# Patient Record
Sex: FEMALE | Race: BLACK OR AFRICAN AMERICAN | Employment: FULL TIME | URBAN - METROPOLITAN AREA
[De-identification: names, ages, dates, MRNs, and addresses within clinical notes are randomized per-mention and may not be internally consistent; named-entity substitution may affect disease eponyms.]

---

## 2018-08-17 ENCOUNTER — HOSPITAL ENCOUNTER (OUTPATIENT)
Dept: WOUND CARE | Age: 58
Discharge: HOME | End: 2018-08-17
Payer: COMMERCIAL

## 2018-08-17 VITALS
SYSTOLIC BLOOD PRESSURE: 141 MMHG | BODY MASS INDEX: 30.49 KG/M2 | RESPIRATION RATE: 18 BRPM | WEIGHT: 183 LBS | DIASTOLIC BLOOD PRESSURE: 101 MMHG | HEART RATE: 94 BPM | HEIGHT: 65 IN | TEMPERATURE: 97.9 F

## 2018-08-17 DIAGNOSIS — R60.0 LEG EDEMA, LEFT: ICD-10-CM

## 2018-08-17 DIAGNOSIS — L97.922 NON-PRESSURE CHRONIC ULCER OF LOWER LEG, LEFT, WITH FAT LAYER EXPOSED (HCC): ICD-10-CM

## 2018-08-17 DIAGNOSIS — L88 PYODERMIC GANGRENOSUM: ICD-10-CM

## 2018-08-17 LAB
ABSOLUTE EOS #: 0.1 K/UL (ref 0–0.4)
ABSOLUTE IMMATURE GRANULOCYTE: ABNORMAL K/UL (ref 0–0.3)
ABSOLUTE LYMPH #: 1.9 K/UL (ref 1–4.8)
ABSOLUTE MONO #: 0.4 K/UL (ref 0.1–1.3)
ALBUMIN SERPL-MCNC: 4.5 G/DL (ref 3.5–5.2)
ALBUMIN/GLOBULIN RATIO: ABNORMAL (ref 1–2.5)
ALP BLD-CCNC: 117 U/L (ref 35–104)
ALT SERPL-CCNC: 9 U/L (ref 5–33)
ANION GAP SERPL CALCULATED.3IONS-SCNC: 11 MMOL/L (ref 9–17)
AST SERPL-CCNC: 15 U/L
BASOPHILS # BLD: 1 % (ref 0–2)
BASOPHILS ABSOLUTE: 0.1 K/UL (ref 0–0.2)
BILIRUB SERPL-MCNC: 0.32 MG/DL (ref 0.3–1.2)
BUN BLDV-MCNC: 10 MG/DL (ref 6–20)
BUN/CREAT BLD: ABNORMAL (ref 9–20)
C-REACTIVE PROTEIN: 18.9 MG/L (ref 0–5)
CALCIUM SERPL-MCNC: 9.3 MG/DL (ref 8.6–10.4)
CHLORIDE BLD-SCNC: 103 MMOL/L (ref 98–107)
CO2: 27 MMOL/L (ref 20–31)
CREAT SERPL-MCNC: 0.56 MG/DL (ref 0.5–0.9)
DIFFERENTIAL TYPE: ABNORMAL
EOSINOPHILS RELATIVE PERCENT: 2 % (ref 0–4)
GFR AFRICAN AMERICAN: >60 ML/MIN
GFR NON-AFRICAN AMERICAN: >60 ML/MIN
GFR SERPL CREATININE-BSD FRML MDRD: ABNORMAL ML/MIN/{1.73_M2}
GFR SERPL CREATININE-BSD FRML MDRD: ABNORMAL ML/MIN/{1.73_M2}
GLUCOSE BLD-MCNC: 127 MG/DL (ref 70–99)
HCT VFR BLD CALC: 35.3 % (ref 36–46)
HEMOGLOBIN: 11.5 G/DL (ref 12–16)
IMMATURE GRANULOCYTES: ABNORMAL %
LYMPHOCYTES # BLD: 38 % (ref 24–44)
MCH RBC QN AUTO: 28.7 PG (ref 26–34)
MCHC RBC AUTO-ENTMCNC: 32.6 G/DL (ref 31–37)
MCV RBC AUTO: 88 FL (ref 80–100)
MONOCYTES # BLD: 8 % (ref 1–7)
NRBC AUTOMATED: ABNORMAL PER 100 WBC
PDW BLD-RTO: 16 % (ref 11.5–14.9)
PLATELET # BLD: 353 K/UL (ref 150–450)
PLATELET ESTIMATE: ABNORMAL
PMV BLD AUTO: 7.8 FL (ref 6–12)
POTASSIUM SERPL-SCNC: 5.1 MMOL/L (ref 3.7–5.3)
RBC # BLD: 4.01 M/UL (ref 4–5.2)
RBC # BLD: ABNORMAL 10*6/UL
RHEUMATOID FACTOR: <10 IU/ML
SEDIMENTATION RATE, ERYTHROCYTE: 32 MM (ref 0–20)
SEG NEUTROPHILS: 51 % (ref 36–66)
SEGMENTED NEUTROPHILS ABSOLUTE COUNT: 2.4 K/UL (ref 1.3–9.1)
SODIUM BLD-SCNC: 141 MMOL/L (ref 135–144)
TOTAL PROTEIN: 7.9 G/DL (ref 6.4–8.3)
WBC # BLD: 4.8 K/UL (ref 3.5–11)
WBC # BLD: ABNORMAL 10*3/UL

## 2018-08-17 PROCEDURE — 86431 RHEUMATOID FACTOR QUANT: CPT

## 2018-08-17 PROCEDURE — 86235 NUCLEAR ANTIGEN ANTIBODY: CPT

## 2018-08-17 PROCEDURE — 86038 ANTINUCLEAR ANTIBODIES: CPT

## 2018-08-17 PROCEDURE — 86812 HLA TYPING A B OR C: CPT

## 2018-08-17 PROCEDURE — 85651 RBC SED RATE NONAUTOMATED: CPT

## 2018-08-17 PROCEDURE — 36415 COLL VENOUS BLD VENIPUNCTURE: CPT

## 2018-08-17 PROCEDURE — 99203 OFFICE O/P NEW LOW 30 MIN: CPT

## 2018-08-17 PROCEDURE — 85025 COMPLETE CBC W/AUTO DIFF WBC: CPT

## 2018-08-17 PROCEDURE — 86140 C-REACTIVE PROTEIN: CPT

## 2018-08-17 PROCEDURE — 99203 OFFICE O/P NEW LOW 30 MIN: CPT | Performed by: PODIATRIST

## 2018-08-17 PROCEDURE — 80053 COMPREHEN METABOLIC PANEL: CPT

## 2018-08-17 RX ORDER — NAPROXEN 500 MG/1
500 TABLET ORAL 2 TIMES DAILY WITH MEALS
Status: ON HOLD | COMMUNITY
End: 2018-09-09 | Stop reason: HOSPADM

## 2018-08-17 RX ORDER — HYDROCODONE BITARTRATE AND ACETAMINOPHEN 5; 325 MG/1; MG/1
1 TABLET ORAL EVERY 8 HOURS PRN
Status: ON HOLD | COMMUNITY
End: 2018-08-20 | Stop reason: HOSPADM

## 2018-08-17 RX ORDER — ZOLPIDEM TARTRATE 10 MG/1
10 TABLET ORAL NIGHTLY PRN
COMMUNITY

## 2018-08-17 ASSESSMENT — PAIN DESCRIPTION - PROGRESSION: CLINICAL_PROGRESSION: NOT CHANGED

## 2018-08-17 ASSESSMENT — PAIN DESCRIPTION - FREQUENCY: FREQUENCY: CONTINUOUS

## 2018-08-17 ASSESSMENT — PAIN DESCRIPTION - ORIENTATION: ORIENTATION: LEFT;LOWER

## 2018-08-17 ASSESSMENT — PAIN DESCRIPTION - DESCRIPTORS: DESCRIPTORS: SHARP;THROBBING

## 2018-08-17 ASSESSMENT — PAIN DESCRIPTION - ONSET: ONSET: ON-GOING

## 2018-08-17 ASSESSMENT — PAIN DESCRIPTION - LOCATION: LOCATION: LEG

## 2018-08-17 ASSESSMENT — PAIN SCALES - GENERAL: PAINLEVEL_OUTOF10: 8

## 2018-08-17 ASSESSMENT — PAIN DESCRIPTION - PAIN TYPE: TYPE: CHRONIC PAIN

## 2018-08-17 NOTE — PROGRESS NOTES
[place] Application   Below Knee    NAME:  Man Holder  YOB: 1960  MEDICAL RECORD NUMBER:  [id number]  DATE:  8/17/2018     [] Removed old Richard Lida boot if indicated and wash leg with mild soap and water.  [] Applied moisturizing agent to dry skin as needed.  [x] Appied primary and secondary dressing as ordered     [x] Applied Unna roll from toes to knee overlapping each time.  [x] Applied ace wrap or coban from toes to below the knee.  [x] Secured with tape and/or metal clips covered with tape.  [x] Instructed patient/caregiver to keep dressing dry and intact. DO NOT REMOVE DRESSING.  [x] Instructed pt/family/caregiver to report excessive draining, loose bandage, wet dressing, severe pain or tingling in toes.  [x] Applied [place] dressing below the knee to left lower leg(s)        Unna Boot(s) were applied per  Guidelines.      Electronically signed by Octaviano Toscano RN on 8/17/2018 at 11:13 AM

## 2018-08-17 NOTE — PROGRESS NOTES
[address]  New Patient History and Physical      Man Holder  AGE: 62 y.o. GENDER: female  : 1960  TODAY'S DATE:  2018    Chief Complaint:   Chief Complaint   Patient presents with    Wound Check     left medial lower leg         History of the Present Illness       Man Holder is a 62 y.o. female who presents today for evaluation and treatment for a venous and non-healing/non-surgical wound which is located on the left    History of Wound: Started over a month ago, possible spider bite. Was on antibiotics, just finished, drainage noted. Had same thing over 10 years ago  Wound Type:non-healing/non-surgical  Wound Location:left leg  Modifying factors:edema    Pain Level: 8   Pain Assessment: 0-10     Abx :Yes   Cultures :were notobtained  Pain : 10    PAST MEDICAL HISTORY    History reviewed. No pertinent past medical history. MEDICATIONS    No current outpatient prescriptions on file prior to encounter. No current facility-administered medications on file prior to encounter. ALLERGIES    Allergies   Allergen Reactions    Penicillins Hives, Itching and Swelling       PAST SURGICAL HISTORY    Past Surgical History:   Procedure Laterality Date    COLONOSCOPY      TUBAL LIGATION      WRIST SURGERY Left        FAMILY HISTORY    family history includes Kidney Disease in her father; Other in her mother. SOCIAL HISTORY    Social History   Substance Use Topics    Smoking status: Never Smoker    Smokeless tobacco: Never Used    Alcohol use No       REVIEW OF SYSTEMS    Pertinent items are noted in HPI.       Objective:      BP (!) 141/101   Pulse 94   Temp 97.9 °F (36.6 °C) (Tympanic)   Resp 18   Ht 5' 5\" (1.651 m)   Wt 183 lb (83 kg)   BMI 30.45 kg/m²   General Appearance: alert and oriented to person, place and time, well-developed and well-nourished, in no acute distress    Wound:         Wound 18 Leg Left;Medial;Lower #1 possible spider bite, 2 weeks questions/problems/concerns. Other comorbidity noted and will be managed by PCP. Procedure Note  No debridement could be performed today. Will set up for biopsy and culture left leg. Wound/Ulcer Descriptions are Pre Debridement except measurements:    Wound 08/17/18 Leg Left;Medial;Lower #1 possible spider bite, 2 weeks ago (Active)   Wound Image   8/17/2018 10:19 AM   Wound Type Wound 8/17/2018 10:19 AM   Wound Other 8/17/2018 10:19 AM   Dressing Status Old drainage 8/17/2018 10:19 AM   Dressing Changed Changed/New 8/17/2018 10:19 AM   Wound Cleansed Rinsed/Irrigated with saline 8/17/2018 10:19 AM   Wound Length (cm) 3 cm 8/17/2018 10:19 AM   Wound Width (cm) 4.4 cm 8/17/2018 10:19 AM   Wound Depth (cm)  0.2 8/17/2018 10:19 AM   Calculated Wound Size (cm^2) (l*w) 13.2 cm^2 8/17/2018 10:19 AM   Wound Assessment Drainage;Red;Yellow;Black;Slough 8/17/2018 10:19 AM   Drainage Amount Moderate 8/17/2018 10:19 AM   Drainage Description Serosanguinous 8/17/2018 10:19 AM   Odor None 8/17/2018 10:19 AM   Margins Defined edges 8/17/2018 10:19 AM   Herlinda-wound Assessment Clean;Dry; Intact 8/17/2018 10:19 AM   Red%Wound Bed 20 8/17/2018 10:19 AM   Yellow%Wound Bed 60 8/17/2018 10:19 AM   Black%Wound Bed 20 8/17/2018 10:19 AM   Number of days: 0       Plan for wound  Dress per physician order  Treatment: Unna boot, silvercel     1. Discussed appropriate home care of this wound.   2. Dressings:   Orders Placed This Encounter   Procedures    CBC Auto Differential     Standing Status:   Future     Standing Expiration Date:   8/18/2019    Rheumatoid Factor     Standing Status:   Future     Standing Expiration Date:   8/17/2019    HEMANTH Screen With Reflex     Standing Status:   Future     Standing Expiration Date:   8/17/2019    HLA-B27 Antigen     Standing Status:   Future     Standing Expiration Date:   8/17/2019    Comprehensive Metabolic Panel     Standing Status:   Future     Standing Expiration Date:   8/17/2019   Amadou Cruz Anti-Scleroderma Antibody     Standing Status:   Future     Standing Expiration Date:   8/17/2019    Sedimentation Rate     Standing Status:   Future     Standing Expiration Date:   8/17/2019    C-Reactive Protein     Standing Status:   Future     Standing Expiration Date:   8/17/2019      3. Follow up: 1 week. 4. Detailed home instructions and education material given to patient prior to discharge.      Electronically signed by Maria Isabel Villa DPM on 8/17/2018 at 11:14 AM

## 2018-08-18 LAB — HLA B27: NEGATIVE

## 2018-08-19 ENCOUNTER — ANESTHESIA EVENT (OUTPATIENT)
Dept: OPERATING ROOM | Age: 58
End: 2018-08-19
Payer: COMMERCIAL

## 2018-08-20 ENCOUNTER — ANESTHESIA (OUTPATIENT)
Dept: OPERATING ROOM | Age: 58
End: 2018-08-20
Payer: COMMERCIAL

## 2018-08-20 ENCOUNTER — HOSPITAL ENCOUNTER (OUTPATIENT)
Age: 58
Setting detail: OBSERVATION
Discharge: HOME | End: 2018-08-20
Attending: PODIATRIST | Admitting: PODIATRIST
Payer: COMMERCIAL

## 2018-08-20 VITALS — DIASTOLIC BLOOD PRESSURE: 65 MMHG | TEMPERATURE: 95.2 F | OXYGEN SATURATION: 100 % | SYSTOLIC BLOOD PRESSURE: 105 MMHG

## 2018-08-20 VITALS
WEIGHT: 183 LBS | SYSTOLIC BLOOD PRESSURE: 149 MMHG | BODY MASS INDEX: 30.49 KG/M2 | RESPIRATION RATE: 20 BRPM | HEIGHT: 65 IN | HEART RATE: 77 BPM | DIASTOLIC BLOOD PRESSURE: 77 MMHG | TEMPERATURE: 97.6 F | OXYGEN SATURATION: 100 %

## 2018-08-20 DIAGNOSIS — G89.18 POST-OP PAIN: Primary | ICD-10-CM

## 2018-08-20 LAB
CULTURE: NORMAL
DIRECT EXAM: NORMAL
Lab: NORMAL
SPECIMEN DESCRIPTION: NORMAL
STATUS: NORMAL

## 2018-08-20 PROCEDURE — 87205 SMEAR GRAM STAIN: CPT

## 2018-08-20 PROCEDURE — 6360000002 HC RX W HCPCS: Performed by: NURSE ANESTHETIST, CERTIFIED REGISTERED

## 2018-08-20 PROCEDURE — 87077 CULTURE AEROBIC IDENTIFY: CPT

## 2018-08-20 PROCEDURE — 3700000001 HC ADD 15 MINUTES (ANESTHESIA): Performed by: PODIATRIST

## 2018-08-20 PROCEDURE — 2580000003 HC RX 258: Performed by: ANESTHESIOLOGY

## 2018-08-20 PROCEDURE — 87075 CULTR BACTERIA EXCEPT BLOOD: CPT

## 2018-08-20 PROCEDURE — 87102 FUNGUS ISOLATION CULTURE: CPT

## 2018-08-20 PROCEDURE — 2709999900 HC NON-CHARGEABLE SUPPLY: Performed by: PODIATRIST

## 2018-08-20 PROCEDURE — 88305 TISSUE EXAM BY PATHOLOGIST: CPT

## 2018-08-20 PROCEDURE — 87070 CULTURE OTHR SPECIMN AEROBIC: CPT

## 2018-08-20 PROCEDURE — 11100 PR BIOPSY OF SKIN LESION: CPT | Performed by: PODIATRIST

## 2018-08-20 PROCEDURE — 7100000000 HC PACU RECOVERY - FIRST 15 MIN: Performed by: PODIATRIST

## 2018-08-20 PROCEDURE — 7100000001 HC PACU RECOVERY - ADDTL 15 MIN: Performed by: PODIATRIST

## 2018-08-20 PROCEDURE — 3600000012 HC SURGERY LEVEL 2 ADDTL 15MIN: Performed by: PODIATRIST

## 2018-08-20 PROCEDURE — 2500000003 HC RX 250 WO HCPCS: Performed by: NURSE ANESTHETIST, CERTIFIED REGISTERED

## 2018-08-20 PROCEDURE — 7100000011 HC PHASE II RECOVERY - ADDTL 15 MIN: Performed by: PODIATRIST

## 2018-08-20 PROCEDURE — G0378 HOSPITAL OBSERVATION PER HR: HCPCS

## 2018-08-20 PROCEDURE — 2500000003 HC RX 250 WO HCPCS: Performed by: PODIATRIST

## 2018-08-20 PROCEDURE — 87176 TISSUE HOMOGENIZATION CULTR: CPT

## 2018-08-20 PROCEDURE — 87186 SC STD MICRODIL/AGAR DIL: CPT

## 2018-08-20 PROCEDURE — 3700000000 HC ANESTHESIA ATTENDED CARE: Performed by: PODIATRIST

## 2018-08-20 PROCEDURE — 7100000010 HC PHASE II RECOVERY - FIRST 15 MIN: Performed by: PODIATRIST

## 2018-08-20 PROCEDURE — 3600000002 HC SURGERY LEVEL 2 BASE: Performed by: PODIATRIST

## 2018-08-20 RX ORDER — SODIUM CHLORIDE 0.9 % (FLUSH) 0.9 %
10 SYRINGE (ML) INJECTION EVERY 12 HOURS SCHEDULED
Status: DISCONTINUED | OUTPATIENT
Start: 2018-08-20 | End: 2018-08-20 | Stop reason: HOSPADM

## 2018-08-20 RX ORDER — OXYCODONE HYDROCHLORIDE AND ACETAMINOPHEN 5; 325 MG/1; MG/1
1 TABLET ORAL
Status: DISCONTINUED | OUTPATIENT
Start: 2018-08-20 | End: 2018-08-20 | Stop reason: HOSPADM

## 2018-08-20 RX ORDER — LIDOCAINE HYDROCHLORIDE 10 MG/ML
1 INJECTION, SOLUTION EPIDURAL; INFILTRATION; INTRACAUDAL; PERINEURAL
Status: DISCONTINUED | OUTPATIENT
Start: 2018-08-20 | End: 2018-08-20 | Stop reason: HOSPADM

## 2018-08-20 RX ORDER — ONDANSETRON 2 MG/ML
4 INJECTION INTRAMUSCULAR; INTRAVENOUS EVERY 6 HOURS PRN
Status: CANCELLED | OUTPATIENT
Start: 2018-08-20

## 2018-08-20 RX ORDER — PROPOFOL 10 MG/ML
INJECTION, EMULSION INTRAVENOUS PRN
Status: DISCONTINUED | OUTPATIENT
Start: 2018-08-20 | End: 2018-08-20 | Stop reason: SDUPTHER

## 2018-08-20 RX ORDER — SODIUM CHLORIDE, SODIUM LACTATE, POTASSIUM CHLORIDE, CALCIUM CHLORIDE 600; 310; 30; 20 MG/100ML; MG/100ML; MG/100ML; MG/100ML
INJECTION, SOLUTION INTRAVENOUS CONTINUOUS
Status: DISCONTINUED | OUTPATIENT
Start: 2018-08-20 | End: 2018-08-20 | Stop reason: HOSPADM

## 2018-08-20 RX ORDER — HYDROCODONE BITARTRATE AND ACETAMINOPHEN 5; 325 MG/1; MG/1
2 TABLET ORAL EVERY 4 HOURS PRN
Status: CANCELLED | OUTPATIENT
Start: 2018-08-20

## 2018-08-20 RX ORDER — HYDROCODONE BITARTRATE AND ACETAMINOPHEN 5; 325 MG/1; MG/1
1 TABLET ORAL EVERY 4 HOURS PRN
Status: CANCELLED | OUTPATIENT
Start: 2018-08-20

## 2018-08-20 RX ORDER — SODIUM CHLORIDE 0.9 % (FLUSH) 0.9 %
10 SYRINGE (ML) INJECTION PRN
Status: DISCONTINUED | OUTPATIENT
Start: 2018-08-20 | End: 2018-08-20 | Stop reason: HOSPADM

## 2018-08-20 RX ORDER — DEXAMETHASONE SODIUM PHOSPHATE 4 MG/ML
INJECTION, SOLUTION INTRA-ARTICULAR; INTRALESIONAL; INTRAMUSCULAR; INTRAVENOUS; SOFT TISSUE PRN
Status: DISCONTINUED | OUTPATIENT
Start: 2018-08-20 | End: 2018-08-20 | Stop reason: SDUPTHER

## 2018-08-20 RX ORDER — MIDAZOLAM HYDROCHLORIDE 1 MG/ML
INJECTION INTRAMUSCULAR; INTRAVENOUS PRN
Status: DISCONTINUED | OUTPATIENT
Start: 2018-08-20 | End: 2018-08-20 | Stop reason: SDUPTHER

## 2018-08-20 RX ORDER — PROMETHAZINE HYDROCHLORIDE 25 MG/ML
6.25 INJECTION, SOLUTION INTRAMUSCULAR; INTRAVENOUS
Status: DISCONTINUED | OUTPATIENT
Start: 2018-08-20 | End: 2018-08-20 | Stop reason: HOSPADM

## 2018-08-20 RX ORDER — LABETALOL HYDROCHLORIDE 5 MG/ML
5 INJECTION, SOLUTION INTRAVENOUS EVERY 10 MIN PRN
Status: DISCONTINUED | OUTPATIENT
Start: 2018-08-20 | End: 2018-08-20 | Stop reason: HOSPADM

## 2018-08-20 RX ORDER — SODIUM CHLORIDE 0.9 % (FLUSH) 0.9 %
10 SYRINGE (ML) INJECTION EVERY 12 HOURS SCHEDULED
Status: CANCELLED | OUTPATIENT
Start: 2018-08-20

## 2018-08-20 RX ORDER — DIPHENHYDRAMINE HYDROCHLORIDE 50 MG/ML
12.5 INJECTION INTRAMUSCULAR; INTRAVENOUS
Status: DISCONTINUED | OUTPATIENT
Start: 2018-08-20 | End: 2018-08-20 | Stop reason: HOSPADM

## 2018-08-20 RX ORDER — FENTANYL CITRATE 50 UG/ML
INJECTION, SOLUTION INTRAMUSCULAR; INTRAVENOUS PRN
Status: DISCONTINUED | OUTPATIENT
Start: 2018-08-20 | End: 2018-08-20 | Stop reason: SDUPTHER

## 2018-08-20 RX ORDER — SODIUM CHLORIDE 0.9 % (FLUSH) 0.9 %
10 SYRINGE (ML) INJECTION PRN
Status: CANCELLED | OUTPATIENT
Start: 2018-08-20

## 2018-08-20 RX ORDER — HYDROCODONE BITARTRATE AND ACETAMINOPHEN 5; 325 MG/1; MG/1
1 TABLET ORAL EVERY 6 HOURS PRN
Qty: 28 TABLET | Refills: 0 | Status: SHIPPED | OUTPATIENT
Start: 2018-08-20 | End: 2018-08-27

## 2018-08-20 RX ORDER — ZOLPIDEM TARTRATE 5 MG/1
5 TABLET ORAL NIGHTLY PRN
Status: CANCELLED | OUTPATIENT
Start: 2018-08-20

## 2018-08-20 RX ORDER — MORPHINE SULFATE 2 MG/ML
2 INJECTION, SOLUTION INTRAMUSCULAR; INTRAVENOUS EVERY 5 MIN PRN
Status: DISCONTINUED | OUTPATIENT
Start: 2018-08-20 | End: 2018-08-20 | Stop reason: HOSPADM

## 2018-08-20 RX ORDER — LIDOCAINE HYDROCHLORIDE 20 MG/ML
INJECTION, SOLUTION INFILTRATION; PERINEURAL PRN
Status: DISCONTINUED | OUTPATIENT
Start: 2018-08-20 | End: 2018-08-20 | Stop reason: SDUPTHER

## 2018-08-20 RX ORDER — ONDANSETRON 2 MG/ML
INJECTION INTRAMUSCULAR; INTRAVENOUS PRN
Status: DISCONTINUED | OUTPATIENT
Start: 2018-08-20 | End: 2018-08-20 | Stop reason: SDUPTHER

## 2018-08-20 RX ADMIN — SODIUM CHLORIDE, POTASSIUM CHLORIDE, SODIUM LACTATE AND CALCIUM CHLORIDE: 600; 310; 30; 20 INJECTION, SOLUTION INTRAVENOUS at 13:57

## 2018-08-20 RX ADMIN — PROPOFOL 150 MG: 10 INJECTION, EMULSION INTRAVENOUS at 13:34

## 2018-08-20 RX ADMIN — SODIUM CHLORIDE, POTASSIUM CHLORIDE, SODIUM LACTATE AND CALCIUM CHLORIDE: 600; 310; 30; 20 INJECTION, SOLUTION INTRAVENOUS at 11:24

## 2018-08-20 RX ADMIN — PROPOFOL 50 MG: 10 INJECTION, EMULSION INTRAVENOUS at 13:36

## 2018-08-20 RX ADMIN — PROPOFOL 50 MG: 10 INJECTION, EMULSION INTRAVENOUS at 13:35

## 2018-08-20 RX ADMIN — LIDOCAINE HYDROCHLORIDE 60 MG: 20 INJECTION, SOLUTION INFILTRATION; PERINEURAL at 13:34

## 2018-08-20 RX ADMIN — ONDANSETRON 4 MG: 2 INJECTION INTRAMUSCULAR; INTRAVENOUS at 13:50

## 2018-08-20 RX ADMIN — FENTANYL CITRATE 100 MCG: 50 INJECTION, SOLUTION INTRAMUSCULAR; INTRAVENOUS at 13:34

## 2018-08-20 RX ADMIN — DEXAMETHASONE SODIUM PHOSPHATE 4 MG: 4 INJECTION, SOLUTION INTRAMUSCULAR; INTRAVENOUS at 13:50

## 2018-08-20 RX ADMIN — MIDAZOLAM 2 MG: 1 INJECTION INTRAMUSCULAR; INTRAVENOUS at 13:30

## 2018-08-20 ASSESSMENT — PAIN SCALES - GENERAL
PAINLEVEL_OUTOF10: 0

## 2018-08-20 ASSESSMENT — PULMONARY FUNCTION TESTS
PIF_VALUE: 16
PIF_VALUE: 8
PIF_VALUE: 21
PIF_VALUE: 16
PIF_VALUE: 2
PIF_VALUE: 16
PIF_VALUE: 2
PIF_VALUE: 16
PIF_VALUE: 0
PIF_VALUE: 15
PIF_VALUE: 16
PIF_VALUE: 2
PIF_VALUE: 14
PIF_VALUE: 21
PIF_VALUE: 16
PIF_VALUE: 16
PIF_VALUE: 20
PIF_VALUE: 33
PIF_VALUE: 16
PIF_VALUE: 16
PIF_VALUE: 18
PIF_VALUE: 16
PIF_VALUE: 16
PIF_VALUE: 2
PIF_VALUE: 16
PIF_VALUE: 1

## 2018-08-20 ASSESSMENT — ENCOUNTER SYMPTOMS
SHORTNESS OF BREATH: 0
STRIDOR: 0

## 2018-08-20 ASSESSMENT — PAIN - FUNCTIONAL ASSESSMENT: PAIN_FUNCTIONAL_ASSESSMENT: 0-10

## 2018-08-20 NOTE — H&P
HISTORY and [facility] [address]       NAME:  Sury Glaser  MRN: [id number]   YOB: 1960   Date: 8/20/2018   Age: 62 y.o. Gender: female       COMPLAINT AND PRESENT HISTORY:     Sury Glaser is 62 y.o.,  female, here for Lt Leg ulcer debridement. The symptoms started 3 weeks   Pt hit her leg 3 weeks ago, developed a blister on the medial aspect of the Lt leg. The blister broke, developed a chronic ulcer that has not responded to ABXs and dressing. Matt Inch Recently the Pt states that edges started getting dark. Pt currently has pressure dressing. Pt had a similar ulcer in the same leg about 10 years ago. Pt denies any other symptoms. PAST MEDICAL HISTORY     Past Medical History:   Diagnosis Date    Arthritis     right knee         SURGICAL HISTORY       Past Surgical History:   Procedure Laterality Date    COLONOSCOPY      TUBAL LIGATION      WRIST SURGERY Left          SOCIAL HISTORY       Social History     Social History    Marital status: Single     Spouse name: N/A    Number of children: N/A    Years of education: N/A     Social History Main Topics    Smoking status: Never Smoker    Smokeless tobacco: Never Used    Alcohol use No    Drug use: No    Sexual activity: Not Asked     Other Topics Concern    None     Social History Narrative    None           REVIEW OF SYSTEMS      Allergies   Allergen Reactions    Penicillins Hives, Itching and Swelling       No current facility-administered medications on file prior to encounter. Current Outpatient Prescriptions on File Prior to Encounter   Medication Sig Dispense Refill    zolpidem (AMBIEN) 5 MG tablet Take 5 mg by mouth nightly as needed for Sleep. Emily Fontenot HYDROcodone-acetaminophen (NORCO) 5-325 MG per tablet Take 1 tablet by mouth every 8 hours as needed for Pain. Emily Fontenot aspirin 81 MG tablet Take 81 mg by mouth daily      naproxen (NAPROSYN) 500 MG tablet Take 500 mg by mouth 2 times daily (with meals)         Negative except for what is mentioned in the HPI. GENERAL PHYSICAL EXAM     Vitals: /88   Pulse 77   Temp 97.5 °F (36.4 °C) (Oral)   Resp 14   Ht 5' 5\" (1.651 m)   Wt 183 lb (83 kg)   LMP  (LMP Unknown)   SpO2 100%   BMI 30.45 kg/m²  Body mass index is 30.45 kg/m². GENERAL APPEARANCE:   Delvis Jackson is 62 y.o.,  female, mildly obese, nourished, conscious, alert. Does not appear to be distress or pain at this time. SKIN:  Warm, dry, no cyanosis or jaundice. HEAD:  Normocephalic, atraumatic, no swelling or tenderness. EYES:  Pupils equal, reactive to light. EARS:  No discharge, no marked hearing loss. NOSE:  No rhinorrhea, epistaxis or septal deformity. THROAT:  Not congested. No ulceration bleeding or discharge. NECK:  No stiffness, trachea central.  No palpable masses or L.N.                 CHEST:  Symmetrical and equal on expansion. HEART:  RRR S1 > S2. No audible murmurs or gallops. LUNGS:  Equal on expansion, normal breath sounds. No adventitious sounds. ABDOMEN:  Obese. Soft on palpation. No localized tenderness. No guarding or rigidity. No palpable hepatosplenomegaly. LYMPHATICS:  No palpable cervical lymphadenopathy. LOCOMOTOR, BACK AND SPINE:  No tenderness or deformities. EXTREMITIES:  Symmetrical, no pretibial edema. Lt Leg ulcer currently covered. Ariess sign negative. NEUROLOGIC:  The patient is conscious, alert, oriented, No apparent focal sensory or motor deficits. PROVISIONAL DIAGNOSES / SURGERY:      Lt Leg ulcer debridement.     Patient Active Problem List    Diagnosis Date Noted    Non-pressure chronic ulcer of lower leg, left, with fat layer exposed ([address] 08/17/2018    Pyodermic gangrenosum 08/17/2018    Leg edema, left 08/17/2018           Wilda Thakur PA-C on 8/20/2018 at 11:47 AM      NAME:  Tabatha Vazquez  MRN: [id number]   YOB: 1960   Date: 8/20/2018   Age: 62 y.o. Gender: female     H&P Update Note    H&P from 8/20/2018  reviewed and updated, Patient examined. Vitals: /88   Pulse 77   Temp 97.5 °F (36.4 °C) (Oral)   Resp 14   Ht 5' 5\" (1.651 m)   Wt 183 lb (83 kg)   LMP  (LMP Unknown)   SpO2 100%   BMI 30.45 kg/m²  Body mass index is 30.45 kg/m². No interval changes except for none. I concur with the findings.        Johnathan Valles DPM on 8/20/2018 at 1:03 PM

## 2018-08-20 NOTE — OP NOTE
Case ID: [id number] Case Time: 8/20/2018  1:31 PM Surgeon: Ambrocio Levy DPM   Procedure: LEG ULCER TISSUE BIOPSY W/CULTURE Location: [address] [place] [address] OP NOTE     PATIENT NAME: Terence Goddard  YOB: 1960  -  62 y.o. female  MRN: [id number]  DATE: 8/20/2018  BILLING #: [id number]     Surgeon(s):  Ambrocio Levy DPM      ASSISTANTS: Toy Dennis DPM      PRE-OP DIAGNOSIS:   1. Chronic nonhealing wound left leg         POST-OP DIAGNOSIS: Same as above.     PROCEDURE:   1. Irrigation and debridement of left leg ulcer with culture  2. Biopsy left leg ulcer     ANESTHESIA: MAC with 20 cc 1:1 mixture of 1% lidocaine plain and 0.5% bupivicaine plain     HEMOSTASIS: No tourniquet     ESTIMATED BLOOD LOSS: Less than 10cc.     MATERIALS: None  * No implants in log *     INJECTABLES: None     SPECIMEN:   1. Left leg ulcer punch biopsy sent to pathology  2. Left leg ulcer margin punch biopsy sent to pathology  3. Left leg ulcer tissue sent to pathology   * No specimens in log *     COMPLICATIONS: None     FINDINGS: Boggy proximal wound margin tissue noted left leg ulcer        Indications:  Physical exam and patient history demonstrates chronic nonhealing wound of the left leg due to possible spider bite. Surgery is indicated at this time to remove necrotic nonviable tissue and establish healthy viable tissue for optimal healing and obtain cultures to determine cause of wound. All risks, benefits, and alternatives to the procedure were explained to the patient who voiced understanding. A consent was signed and placed in the chart. Procedure:  Patient was transferred to the operating room and placed on the table in the supine position. Following induction of general anesthesia, the left leg was scrubbed, prepped, and draped in the usual aseptic manner. Local anesthesia was obtained in the left leg using a total of 20 cc of 1:1 mixture of 1% lidocaine plain and 0.5% bupivicaine plain.  A pneumatic

## 2018-08-20 NOTE — PROGRESS NOTES
Made several attempts to contact the patients ride home. No answer or no return call. Patient wishes to leave, instructed patient this would be against medical advice. Offered to call the  For admission, offered taxi, offered to contact someone else for ride home. Patient refuses all these options. Patient is alert, oriented, has been awake the entire stay here in short stay the last 3.5 hours reading a book and signed AMA papers. Notified the resident Dr. Kusum Dickens per text in perfect serve.

## 2018-08-22 LAB
ANTI-NUCLEAR ANTIBODY (ANA): ABNORMAL
ANTI-SCLERODERMA: 10 U/ML

## 2018-08-24 ENCOUNTER — HOSPITAL ENCOUNTER (OUTPATIENT)
Dept: WOUND CARE | Age: 58
Discharge: HOME | End: 2018-08-24
Payer: COMMERCIAL

## 2018-08-24 VITALS
HEIGHT: 65 IN | RESPIRATION RATE: 20 BRPM | BODY MASS INDEX: 30.49 KG/M2 | TEMPERATURE: 98.3 F | SYSTOLIC BLOOD PRESSURE: 145 MMHG | HEART RATE: 96 BPM | DIASTOLIC BLOOD PRESSURE: 89 MMHG | WEIGHT: 183 LBS

## 2018-08-24 DIAGNOSIS — L97.922 NON-PRESSURE CHRONIC ULCER OF LOWER LEG, LEFT, WITH FAT LAYER EXPOSED (HCC): Primary | ICD-10-CM

## 2018-08-24 DIAGNOSIS — L88 PYODERMA GANGRENOSUM: ICD-10-CM

## 2018-08-24 DIAGNOSIS — G89.18 ACUTE POST-OPERATIVE PAIN: ICD-10-CM

## 2018-08-24 DIAGNOSIS — R60.0 LEG EDEMA, LEFT: ICD-10-CM

## 2018-08-24 LAB
ESTIMATED AVERAGE GLUCOSE: 120 MG/DL
HBA1C MFR BLD: 5.8 % (ref 4–6)

## 2018-08-24 PROCEDURE — 99213 OFFICE O/P EST LOW 20 MIN: CPT | Performed by: PODIATRIST

## 2018-08-24 PROCEDURE — 29580 STRAPPING UNNA BOOT: CPT

## 2018-08-24 PROCEDURE — 83036 HEMOGLOBIN GLYCOSYLATED A1C: CPT

## 2018-08-24 PROCEDURE — 36415 COLL VENOUS BLD VENIPUNCTURE: CPT

## 2018-08-24 RX ORDER — LIDOCAINE HYDROCHLORIDE 40 MG/ML
SOLUTION TOPICAL ONCE
Status: DISCONTINUED | OUTPATIENT
Start: 2018-08-24 | End: 2018-08-25 | Stop reason: HOSPADM

## 2018-08-24 RX ORDER — CIPROFLOXACIN 500 MG/1
500 TABLET, FILM COATED ORAL 2 TIMES DAILY
Qty: 28 TABLET | Refills: 0 | Status: SHIPPED | OUTPATIENT
Start: 2018-08-24 | End: 2018-08-31 | Stop reason: ALTCHOICE

## 2018-08-24 ASSESSMENT — PAIN DESCRIPTION - LOCATION: LOCATION: LEG

## 2018-08-24 ASSESSMENT — PAIN DESCRIPTION - PAIN TYPE: TYPE: CHRONIC PAIN

## 2018-08-24 ASSESSMENT — PAIN SCALES - GENERAL: PAINLEVEL_OUTOF10: 4

## 2018-08-24 ASSESSMENT — PAIN DESCRIPTION - FREQUENCY: FREQUENCY: INTERMITTENT

## 2018-08-24 ASSESSMENT — PAIN DESCRIPTION - DESCRIPTORS: DESCRIPTORS: ACHING

## 2018-08-24 ASSESSMENT — PAIN DESCRIPTION - ORIENTATION: ORIENTATION: LEFT;LOWER

## 2018-08-24 NOTE — PROGRESS NOTES
[facility]  Return Patient Progress Note      Jesus Grubbs  AGE: 62 y.o. GENDER: female  : 1960  TODAY'S DATE:  2018    Chief Complaint:   Chief Complaint   Patient presents with    Wound Check      left lower leg         History of the Present Illness       Jesus Grubbs is a 62 y.o. female who presents today for evaluation and treatment for a venous and non-healing/non-surgical wound which is located on the left leg. Patient underwent a biopsy and debridement of ulcer on (18). Patient states that she is still having pain in her leg. Patient states that she is unsure of how the wound started. Patient denies any n/v/f/c. History of Wound: Started over a month ago, possible spider bite vs trauma to leg. Was on antibiotics, just finished, drainage noted. Had same thing over 10 years ago  Wound Type:non-healing/non-surgical  Wound Location:left leg  Modifying factors:edema    Pain Level: 4   Pain Assessment: 0-10     Abx :Yes   Cultures :were notobtained  Pain : 7/10    PAST MEDICAL HISTORY        Diagnosis Date    Arthritis     right knee       MEDICATIONS    Current Outpatient Prescriptions on File Prior to Encounter   Medication Sig Dispense Refill    HYDROcodone-acetaminophen (NORCO) 5-325 MG per tablet Take 1 tablet by mouth every 6 hours as needed for Pain for up to 7 days. Intended supply: 3 days. Take lowest dose possible to manage pain. 28 tablet 0    zolpidem (AMBIEN) 5 MG tablet Take 5 mg by mouth nightly as needed for Sleep. Lizzy Velázquez naproxen (NAPROSYN) 500 MG tablet Take 500 mg by mouth 2 times daily (with meals)      aspirin 81 MG tablet Take 81 mg by mouth daily       No current facility-administered medications on file prior to encounter.         ALLERGIES    Allergies   Allergen Reactions    Penicillins Hives, Itching and Swelling       PAST SURGICAL HISTORY    Past Surgical History:   Procedure Laterality Date    COLONOSCOPY      ME INCIS/DRAIN THIGH/KNEE ABSCESS,DEEP Left 8/20/2018    LEG ULCER TISSUE BIOPSY W/CULTURE performed by Josefa Candelaria DPM at [facility] Left        FAMILY HISTORY    family history includes Kidney Disease in her father; Other in her mother. SOCIAL HISTORY    Social History   Substance Use Topics    Smoking status: Never Smoker    Smokeless tobacco: Never Used    Alcohol use No       REVIEW OF SYSTEMS    Pertinent items are noted in HPI. Objective:      BP (!) 145/89   Pulse 96   Temp 98.3 °F (36.8 °C) (Tympanic)   Resp 20   Ht 5' 5\" (1.651 m)   Wt 183 lb (83 kg)   LMP  (LMP Unknown)   BMI 30.45 kg/m²   General Appearance: alert and oriented to person, place and time, well-developed and well-nourished, in no acute distress    Wound 08/17/18 Leg Left;Medial;Lower #1 possible spider bite, 2 weeks ago (Active)   Wound Image   8/17/2018 10:19 AM   Wound Type Wound 8/24/2018  9:52 AM   Wound Other 8/24/2018  9:52 AM   Dressing Status Old drainage 8/24/2018  9:52 AM   Dressing Changed Changed/New 8/24/2018  9:52 AM   Wound Cleansed Rinsed/Irrigated with saline 8/24/2018  9:52 AM   Wound Length (cm) 3.2 cm 8/24/2018  9:52 AM   Wound Width (cm) 4.5 cm 8/24/2018  9:52 AM   Wound Depth (cm)  0.1 8/24/2018  9:52 AM   Calculated Wound Size (cm^2) (l*w) 14.4 cm^2 8/24/2018  9:52 AM   Change in Wound Size % (l*w) -9.09 8/24/2018  9:52 AM   Wound Assessment Drainage;Edema;Red; White 8/24/2018  9:52 AM   Drainage Amount Moderate 8/24/2018  9:52 AM   Drainage Description Serosanguinous 8/24/2018  9:52 AM   Odor None 8/24/2018  9:52 AM   Margins Defined edges 8/24/2018  9:52 AM   Herlinda-wound Assessment Clean;Dry; Intact 8/24/2018  9:52 AM   Red%Wound Bed 90 8/24/2018  9:52 AM   Yellow%Wound Bed 0 8/24/2018  9:52 AM   Black%Wound Bed 0 8/24/2018  9:52 AM   Other%Wound Bed 10%white 8/24/2018  9:52 AM   Number of days: 7     Integument: wound documentation see above.      Vascular:  DP/PT pulses palpable 2/4, medical students or scribes is based on my personal performance of the HPI, PE and MDM. I have personally evaluated this patient and have completed at least one if not all key elements of the E/M (history, physical exam, and MDM). Additional findings are as noted.      Ambrocio Levy DPM on 8/27/2018 at 1:06 AM  Board Certified, American Board of Podiatric Surgery  Fellow, [employer] [facility] and [facility]

## 2018-08-25 LAB
CULTURE: ABNORMAL
CULTURE: ABNORMAL
DIRECT EXAM: ABNORMAL
DIRECT EXAM: ABNORMAL
Lab: ABNORMAL
ORGANISM: ABNORMAL
SPECIMEN DESCRIPTION: ABNORMAL
STATUS: ABNORMAL

## 2018-08-27 LAB
SURGICAL PATHOLOGY REPORT: NORMAL
SURGICAL PATHOLOGY REPORT: NORMAL

## 2018-08-31 ENCOUNTER — HOSPITAL ENCOUNTER (OUTPATIENT)
Dept: WOUND CARE | Age: 58
Discharge: HOME | End: 2018-08-31
Payer: COMMERCIAL

## 2018-08-31 VITALS
BODY MASS INDEX: 30.49 KG/M2 | SYSTOLIC BLOOD PRESSURE: 139 MMHG | HEART RATE: 101 BPM | HEIGHT: 65 IN | TEMPERATURE: 97.8 F | WEIGHT: 183 LBS | DIASTOLIC BLOOD PRESSURE: 90 MMHG | RESPIRATION RATE: 20 BRPM

## 2018-08-31 DIAGNOSIS — L97.922 NON-PRESSURE CHRONIC ULCER OF LOWER LEG, LEFT, WITH FAT LAYER EXPOSED (HCC): Primary | ICD-10-CM

## 2018-08-31 DIAGNOSIS — I87.2 VENOUS INSUFFICIENCY OF LEFT LOWER EXTREMITY: ICD-10-CM

## 2018-08-31 DIAGNOSIS — R60.0 LEG EDEMA, LEFT: ICD-10-CM

## 2018-08-31 PROCEDURE — 29580 STRAPPING UNNA BOOT: CPT

## 2018-08-31 PROCEDURE — 6370000000 HC RX 637 (ALT 250 FOR IP): Performed by: PODIATRIST

## 2018-08-31 PROCEDURE — 99213 OFFICE O/P EST LOW 20 MIN: CPT | Performed by: PODIATRIST

## 2018-08-31 PROCEDURE — 99213 OFFICE O/P EST LOW 20 MIN: CPT

## 2018-08-31 RX ORDER — LIDOCAINE HYDROCHLORIDE 40 MG/ML
SOLUTION TOPICAL ONCE
Status: COMPLETED | OUTPATIENT
Start: 2018-08-31 | End: 2018-08-31

## 2018-08-31 RX ORDER — DOXYCYCLINE HYCLATE 100 MG/1
100 CAPSULE ORAL 2 TIMES DAILY
COMMUNITY
Start: 2018-08-18 | End: 2018-09-02

## 2018-08-31 RX ADMIN — LIDOCAINE HYDROCHLORIDE: 40 SOLUTION TOPICAL at 09:10

## 2018-08-31 ASSESSMENT — PAIN DESCRIPTION - DESCRIPTORS: DESCRIPTORS: ACHING

## 2018-08-31 ASSESSMENT — PAIN DESCRIPTION - PROGRESSION: CLINICAL_PROGRESSION: NOT CHANGED

## 2018-08-31 ASSESSMENT — PAIN DESCRIPTION - PAIN TYPE: TYPE: CHRONIC PAIN

## 2018-08-31 ASSESSMENT — PAIN SCALES - GENERAL: PAINLEVEL_OUTOF10: 5

## 2018-08-31 ASSESSMENT — PAIN DESCRIPTION - ORIENTATION: ORIENTATION: LEFT;LOWER

## 2018-08-31 ASSESSMENT — PAIN DESCRIPTION - ONSET: ONSET: ON-GOING

## 2018-08-31 ASSESSMENT — PAIN DESCRIPTION - LOCATION: LOCATION: LEG

## 2018-08-31 ASSESSMENT — PAIN DESCRIPTION - FREQUENCY: FREQUENCY: INTERMITTENT

## 2018-08-31 NOTE — PROGRESS NOTES
[place] Application   Below Knee    NAME:  Hayley Grant  YOB: 1960  MEDICAL RECORD NUMBER:  [id number]  DATE:  8/31/2018     [] Applied moisturizing agent to dry skin as needed.  [x] Appied primary and secondary dressing as ordered     [x] Applied Unna roll from toes to knee overlapping each time.  [x] Applied ace wrap or coban from toes to below the knee.  [] Secured with tape and/or metal clips covered with tape.  [x] Instructed patient/caregiver to keep dressing dry and intact. DO NOT REMOVE DRESSING.  [x] Instructed pt/family/caregiver to report excessive draining, loose bandage, wet dressing, severe pain or tingling in toes.  [x] Applied [place] dressing below the knee to Left lower leg(s)        Unna Boot(s) were applied per  Guidelines.      Electronically signed by Aries Thornton RN on 8/31/2018 at 9:47 AM
[facility]    TUBAL LIGATION      WRIST SURGERY Left        FAMILY HISTORY    family history includes Kidney Disease in her father; Other in her mother. SOCIAL HISTORY    Social History   Substance Use Topics    Smoking status: Never Smoker    Smokeless tobacco: Never Used    Alcohol use No       REVIEW OF SYSTEMS    Pertinent items are noted in HPI. Objective:      BP (!) 139/90   Pulse 101   Temp 97.8 °F (36.6 °C) (Tympanic)   Resp 20   Ht 5' 5\" (1.651 m)   Wt 183 lb (83 kg)   LMP  (LMP Unknown)   BMI 30.45 kg/m²   General Appearance: alert and oriented to person, place and time, well-developed and well-nourished, in no acute distress    Wound 08/17/18 Leg Left;Medial;Lower #1 LEFT LOWER LEG, 2 weeks ago (Active)   Wound Image   8/17/2018 10:19 AM   Wound Type Wound 8/31/2018  9:08 AM   Wound Traumatic 8/31/2018  9:08 AM   Dressing Status Old drainage 8/31/2018  9:08 AM   Dressing Changed Changed/New 8/31/2018  9:08 AM   Wound Cleansed Rinsed/Irrigated with saline 8/31/2018  9:08 AM   Wound Length (cm) 2.7 cm 8/31/2018  9:08 AM   Wound Width (cm) 4 cm 8/31/2018  9:08 AM   Wound Depth (cm)  .1 8/31/2018  9:08 AM   Calculated Wound Size (cm^2) (l*w) 10.8 cm^2 8/31/2018  9:08 AM   Change in Wound Size % (l*w) 18.18 8/31/2018  9:08 AM   Wound Assessment Drainage 8/31/2018  9:08 AM   Drainage Amount Moderate 8/31/2018  9:08 AM   Drainage Description Serosanguinous 8/31/2018  9:08 AM   Odor None 8/24/2018  9:52 AM   Margins Defined edges 8/24/2018  9:52 AM   Herlinda-wound Assessment Clean;Pink 8/31/2018  9:08 AM   Red%Wound Bed 80 8/31/2018  9:08 AM   Yellow%Wound Bed 10 8/31/2018  9:08 AM   Black%Wound Bed 10 8/31/2018  9:08 AM   Other%Wound Bed 10%white 8/24/2018  9:52 AM   Number of days: 13       Integument: wound documentation see above.      Vascular:  DP/PT pulses palpable 2/4, Bilateral.    CFT <3 seconds to digits 1-5, Bilateral .   Hair growth present to level of digits, Bilateral.  Edema present,

## 2018-08-31 NOTE — PLAN OF CARE
Problem: Compression therapy:  Goal: Will be free from complications associated with compression therapy  Will be free from complications associated with compression therapy   Outcome: Ongoing

## 2018-09-07 ENCOUNTER — APPOINTMENT (OUTPATIENT)
Dept: CT IMAGING | Age: 58
DRG: 689 | End: 2018-09-07
Payer: COMMERCIAL

## 2018-09-07 ENCOUNTER — HOSPITAL ENCOUNTER (OUTPATIENT)
Dept: WOUND CARE | Age: 58
Discharge: HOME | End: 2018-09-07
Payer: COMMERCIAL

## 2018-09-07 ENCOUNTER — HOSPITAL ENCOUNTER (INPATIENT)
Age: 58
LOS: 2 days | Discharge: HOME | DRG: 689 | End: 2018-09-09
Attending: EMERGENCY MEDICINE | Admitting: INTERNAL MEDICINE
Payer: COMMERCIAL

## 2018-09-07 DIAGNOSIS — L97.922 NON-PRESSURE CHRONIC ULCER OF LOWER LEG, LEFT, WITH FAT LAYER EXPOSED (HCC): Chronic | ICD-10-CM

## 2018-09-07 DIAGNOSIS — E16.2 HYPOGLYCEMIA: Primary | ICD-10-CM

## 2018-09-07 DIAGNOSIS — N17.9 AKI (ACUTE KIDNEY INJURY) (HCC): Primary | ICD-10-CM

## 2018-09-07 PROBLEM — M25.551 RIGHT HIP PAIN: Status: ACTIVE | Noted: 2018-09-07

## 2018-09-07 LAB
-: ABNORMAL
-: NORMAL
ABSOLUTE EOS #: 0 K/UL (ref 0–0.4)
ABSOLUTE IMMATURE GRANULOCYTE: ABNORMAL K/UL (ref 0–0.3)
ABSOLUTE LYMPH #: 1.5 K/UL (ref 1–4.8)
ABSOLUTE MONO #: 0.6 K/UL (ref 0.1–1.3)
AMORPHOUS: ABNORMAL
ANION GAP SERPL CALCULATED.3IONS-SCNC: 12 MMOL/L (ref 9–17)
BACTERIA: ABNORMAL
BASOPHILS # BLD: 0 % (ref 0–2)
BASOPHILS ABSOLUTE: 0 K/UL (ref 0–0.2)
BILIRUBIN URINE: NEGATIVE
BUN BLDV-MCNC: 18 MG/DL (ref 6–20)
BUN/CREAT BLD: ABNORMAL (ref 9–20)
CALCIUM SERPL-MCNC: 9.7 MG/DL (ref 8.6–10.4)
CASTS UA: ABNORMAL /LPF
CHLORIDE BLD-SCNC: 105 MMOL/L (ref 98–107)
CO2: 24 MMOL/L (ref 20–31)
COLOR: YELLOW
COMMENT UA: ABNORMAL
CREAT SERPL-MCNC: 1.88 MG/DL (ref 0.5–0.9)
CREATININE URINE: 44.5 MG/DL (ref 28–217)
CRYSTALS, UA: ABNORMAL /HPF
DIFFERENTIAL TYPE: ABNORMAL
EOSINOPHILS RELATIVE PERCENT: 0 % (ref 0–4)
EPITHELIAL CELLS UA: ABNORMAL /HPF
GFR AFRICAN AMERICAN: 33 ML/MIN
GFR NON-AFRICAN AMERICAN: 27 ML/MIN
GFR SERPL CREATININE-BSD FRML MDRD: ABNORMAL ML/MIN/{1.73_M2}
GFR SERPL CREATININE-BSD FRML MDRD: ABNORMAL ML/MIN/{1.73_M2}
GLUCOSE BLD-MCNC: 103 MG/DL (ref 70–99)
GLUCOSE URINE: NEGATIVE
HCT VFR BLD CALC: 35.5 % (ref 36–46)
HEMOGLOBIN: 11.8 G/DL (ref 12–16)
IMMATURE GRANULOCYTES: ABNORMAL %
KETONES, URINE: NEGATIVE
LEUKOCYTE ESTERASE, URINE: ABNORMAL
LYMPHOCYTES # BLD: 20 % (ref 24–44)
MCH RBC QN AUTO: 29.6 PG (ref 26–34)
MCHC RBC AUTO-ENTMCNC: 33.4 G/DL (ref 31–37)
MCV RBC AUTO: 88.8 FL (ref 80–100)
MONOCYTES # BLD: 8 % (ref 1–7)
MUCUS: ABNORMAL
NITRITE, URINE: NEGATIVE
NRBC AUTOMATED: ABNORMAL PER 100 WBC
OTHER OBSERVATIONS UA: ABNORMAL
PDW BLD-RTO: 15.9 % (ref 11.5–14.9)
PH UA: 6 (ref 5–8)
PLATELET # BLD: 349 K/UL (ref 150–450)
PLATELET ESTIMATE: ABNORMAL
PMV BLD AUTO: 8.2 FL (ref 6–12)
POTASSIUM SERPL-SCNC: 4.5 MMOL/L (ref 3.7–5.3)
PROTEIN UA: NEGATIVE
RBC # BLD: 4 M/UL (ref 4–5.2)
RBC # BLD: ABNORMAL 10*6/UL
RBC UA: ABNORMAL /HPF
REASON FOR REJECTION: NORMAL
RENAL EPITHELIAL, UA: ABNORMAL /HPF
SEG NEUTROPHILS: 72 % (ref 36–66)
SEGMENTED NEUTROPHILS ABSOLUTE COUNT: 5.4 K/UL (ref 1.3–9.1)
SODIUM BLD-SCNC: 141 MMOL/L (ref 135–144)
SODIUM,UR: 37 MMOL/L
SPECIFIC GRAVITY UA: 1.01 (ref 1–1.03)
TRICHOMONAS: ABNORMAL
TSH SERPL DL<=0.05 MIU/L-ACNC: 0.32 MIU/L (ref 0.3–5)
TURBIDITY: CLEAR
URINE HGB: NEGATIVE
UROBILINOGEN, URINE: NORMAL
WBC # BLD: 7.5 K/UL (ref 3.5–11)
WBC # BLD: ABNORMAL 10*3/UL
WBC UA: ABNORMAL /HPF
YEAST: ABNORMAL
ZZ NTE CLEAN UP: ORDERED TEST: NORMAL
ZZ NTE WITH NAME CLEAN UP: SPECIMEN SOURCE: NORMAL

## 2018-09-07 PROCEDURE — 6360000002 HC RX W HCPCS: Performed by: STUDENT IN AN ORGANIZED HEALTH CARE EDUCATION/TRAINING PROGRAM

## 2018-09-07 PROCEDURE — 81001 URINALYSIS AUTO W/SCOPE: CPT

## 2018-09-07 PROCEDURE — 82570 ASSAY OF URINE CREATININE: CPT

## 2018-09-07 PROCEDURE — 6360000002 HC RX W HCPCS: Performed by: FAMILY MEDICINE

## 2018-09-07 PROCEDURE — 99285 EMERGENCY DEPT VISIT HI MDM: CPT

## 2018-09-07 PROCEDURE — 72192 CT PELVIS W/O DYE: CPT

## 2018-09-07 PROCEDURE — 84443 ASSAY THYROID STIM HORMONE: CPT

## 2018-09-07 PROCEDURE — 70450 CT HEAD/BRAIN W/O DYE: CPT

## 2018-09-07 PROCEDURE — 99223 1ST HOSP IP/OBS HIGH 75: CPT | Performed by: INTERNAL MEDICINE

## 2018-09-07 PROCEDURE — 87086 URINE CULTURE/COLONY COUNT: CPT

## 2018-09-07 PROCEDURE — 84300 ASSAY OF URINE SODIUM: CPT

## 2018-09-07 PROCEDURE — 2580000003 HC RX 258: Performed by: STUDENT IN AN ORGANIZED HEALTH CARE EDUCATION/TRAINING PROGRAM

## 2018-09-07 PROCEDURE — 80048 BASIC METABOLIC PNL TOTAL CA: CPT

## 2018-09-07 PROCEDURE — 1200000000 HC SEMI PRIVATE

## 2018-09-07 PROCEDURE — 36415 COLL VENOUS BLD VENIPUNCTURE: CPT

## 2018-09-07 PROCEDURE — 6370000000 HC RX 637 (ALT 250 FOR IP): Performed by: INTERNAL MEDICINE

## 2018-09-07 PROCEDURE — 85025 COMPLETE CBC W/AUTO DIFF WBC: CPT

## 2018-09-07 PROCEDURE — 2580000003 HC RX 258: Performed by: EMERGENCY MEDICINE

## 2018-09-07 PROCEDURE — 82947 ASSAY GLUCOSE BLOOD QUANT: CPT

## 2018-09-07 PROCEDURE — 6370000000 HC RX 637 (ALT 250 FOR IP): Performed by: FAMILY MEDICINE

## 2018-09-07 RX ORDER — ACETAMINOPHEN 325 MG/1
650 TABLET ORAL EVERY 4 HOURS PRN
Status: DISCONTINUED | OUTPATIENT
Start: 2018-09-07 | End: 2018-09-09 | Stop reason: HOSPADM

## 2018-09-07 RX ORDER — SODIUM CHLORIDE 9 MG/ML
INJECTION, SOLUTION INTRAVENOUS CONTINUOUS
Status: DISCONTINUED | OUTPATIENT
Start: 2018-09-07 | End: 2018-09-08

## 2018-09-07 RX ORDER — SODIUM CHLORIDE 0.9 % (FLUSH) 0.9 %
10 SYRINGE (ML) INJECTION PRN
Status: DISCONTINUED | OUTPATIENT
Start: 2018-09-07 | End: 2018-09-09 | Stop reason: HOSPADM

## 2018-09-07 RX ORDER — ACETAMINOPHEN 325 MG/1
650 TABLET ORAL EVERY 4 HOURS PRN
Status: DISCONTINUED | OUTPATIENT
Start: 2018-09-07 | End: 2018-09-07

## 2018-09-07 RX ORDER — 0.9 % SODIUM CHLORIDE 0.9 %
1000 INTRAVENOUS SOLUTION INTRAVENOUS ONCE
Status: COMPLETED | OUTPATIENT
Start: 2018-09-07 | End: 2018-09-07

## 2018-09-07 RX ORDER — HEPARIN SODIUM 5000 [USP'U]/ML
5000 INJECTION, SOLUTION INTRAVENOUS; SUBCUTANEOUS EVERY 8 HOURS SCHEDULED
Status: DISCONTINUED | OUTPATIENT
Start: 2018-09-07 | End: 2018-09-09 | Stop reason: HOSPADM

## 2018-09-07 RX ORDER — M-VIT,TX,IRON,MINS/CALC/FOLIC 27MG-0.4MG
1 TABLET ORAL DAILY
COMMUNITY

## 2018-09-07 RX ORDER — OXYCODONE HYDROCHLORIDE AND ACETAMINOPHEN 5; 325 MG/1; MG/1
1 TABLET ORAL EVERY 6 HOURS PRN
Status: DISCONTINUED | OUTPATIENT
Start: 2018-09-07 | End: 2018-09-09 | Stop reason: HOSPADM

## 2018-09-07 RX ORDER — ONDANSETRON 2 MG/ML
4 INJECTION INTRAMUSCULAR; INTRAVENOUS EVERY 6 HOURS PRN
Status: DISCONTINUED | OUTPATIENT
Start: 2018-09-07 | End: 2018-09-09 | Stop reason: HOSPADM

## 2018-09-07 RX ORDER — SODIUM CHLORIDE 0.9 % (FLUSH) 0.9 %
10 SYRINGE (ML) INJECTION EVERY 12 HOURS SCHEDULED
Status: DISCONTINUED | OUTPATIENT
Start: 2018-09-07 | End: 2018-09-09 | Stop reason: HOSPADM

## 2018-09-07 RX ORDER — ASPIRIN 81 MG/1
81 TABLET ORAL DAILY
Status: DISCONTINUED | OUTPATIENT
Start: 2018-09-07 | End: 2018-09-09 | Stop reason: HOSPADM

## 2018-09-07 RX ADMIN — ASPIRIN 81 MG: 81 TABLET, COATED ORAL at 20:06

## 2018-09-07 RX ADMIN — SODIUM CHLORIDE: 9 INJECTION, SOLUTION INTRAVENOUS at 18:17

## 2018-09-07 RX ADMIN — ACETAMINOPHEN 650 MG: 325 TABLET, FILM COATED ORAL at 13:40

## 2018-09-07 RX ADMIN — VITAMIN D, TAB 1000IU (100/BT) 1000 UNITS: 25 TAB at 20:06

## 2018-09-07 RX ADMIN — CEFTRIAXONE SODIUM 1 G: 1 INJECTION, POWDER, FOR SOLUTION INTRAMUSCULAR; INTRAVENOUS at 18:17

## 2018-09-07 RX ADMIN — HEPARIN SODIUM 5000 UNITS: 5000 INJECTION, SOLUTION INTRAVENOUS; SUBCUTANEOUS at 22:13

## 2018-09-07 RX ADMIN — OXYCODONE HYDROCHLORIDE AND ACETAMINOPHEN 1 TABLET: 5; 325 TABLET ORAL at 20:06

## 2018-09-07 RX ADMIN — SODIUM CHLORIDE 1000 ML: 9 INJECTION, SOLUTION INTRAVENOUS at 11:05

## 2018-09-07 ASSESSMENT — ENCOUNTER SYMPTOMS
NAUSEA: 0
BACK PAIN: 0
COUGH: 0
EYE PAIN: 0
ABDOMINAL PAIN: 0
RHINORRHEA: 0
SHORTNESS OF BREATH: 0
EYE REDNESS: 0
VOMITING: 0

## 2018-09-07 ASSESSMENT — PAIN DESCRIPTION - LOCATION: LOCATION: HEAD

## 2018-09-07 ASSESSMENT — PAIN SCALES - GENERAL
PAINLEVEL_OUTOF10: 1
PAINLEVEL_OUTOF10: 4
PAINLEVEL_OUTOF10: 0
PAINLEVEL_OUTOF10: 8
PAINLEVEL_OUTOF10: 7
PAINLEVEL_OUTOF10: 3

## 2018-09-07 ASSESSMENT — PAIN DESCRIPTION - PAIN TYPE: TYPE: ACUTE PAIN

## 2018-09-07 NOTE — H&P
RBC Morphology NOT REPORTED     Platelet Estimate NOT REPORTED    SPECIMEN REJECTION    Collection Time: 09/07/18  9:45 AM   Result Value Ref Range    Specimen Source . BLOOD     Ordered Test BMP, TSH     Reason for Rejection Unable to perform testing: Specimen hemolyzed.      - NOT REPORTED    Basic Metabolic Panel    Collection Time: 09/07/18 10:19 AM   Result Value Ref Range    Glucose 103 (H) 70 - 99 mg/dL    BUN 18 6 - 20 mg/dL    CREATININE 1.88 (H) 0.50 - 0.90 mg/dL    Bun/Cre Ratio NOT REPORTED 9 - 20    Calcium 9.7 8.6 - 10.4 mg/dL    Sodium 141 135 - 144 mmol/L    Potassium 4.5 3.7 - 5.3 mmol/L    Chloride 105 98 - 107 mmol/L    CO2 24 20 - 31 mmol/L    Anion Gap 12 9 - 17 mmol/L    GFR Non-African American 27 (L) >60 mL/min    GFR  33 (L) >60 mL/min    GFR Comment          GFR Staging NOT REPORTED    TSH without Reflex    Collection Time: 09/07/18 10:19 AM   Result Value Ref Range    TSH 0.32 0.30 - 5.00 mIU/L   Urinalysis    Collection Time: 09/07/18 10:50 AM   Result Value Ref Range    Color, UA YELLOW YEL    Turbidity UA CLEAR CLEAR    Glucose, Ur NEGATIVE NEG    Bilirubin Urine NEGATIVE NEG    Ketones, Urine NEGATIVE NEG    Specific Gravity, UA 1.008 1.000 - 1.030    Urine Hgb NEGATIVE NEG    pH, UA 6.0 5.0 - 8.0    Protein, UA NEGATIVE NEG    Urobilinogen, Urine Normal NORM    Nitrite, Urine NEGATIVE NEG    Leukocyte Esterase, Urine TRACE (A) NEG    Urinalysis Comments NOT REPORTED    Microscopic Urinalysis    Collection Time: 09/07/18 10:50 AM   Result Value Ref Range    -          WBC, UA 2 TO 5 /HPF    RBC, UA 0 TO 2 /HPF    Casts UA NOT REPORTED /LPF    Crystals UA NOT REPORTED NONE /HPF    Epithelial Cells UA 2 TO 5 /HPF    Renal Epithelial, Urine NOT REPORTED 0 /HPF    Bacteria, UA FEW (A) NONE    Mucus, UA NOT REPORTED NONE    Trichomonas, UA NOT REPORTED NONE    Amorphous, UA 1+ (A) NONE    Other Observations UA NOT REPORTED NREQ    Yeast, UA NOT REPORTED NONE Imaging/Diagnostics:  Ct Head Wo Contrast    Result Date: 9/7/2018  EXAMINATION: CT OF THE HEAD WITHOUT CONTRAST  9/7/2018 9:56 am TECHNIQUE: CT of the head was performed without the administration of intravenous contrast. Dose modulation, iterative reconstruction, and/or weight based adjustment of the mA/kV was utilized to reduce the radiation dose to as low as reasonably achievable. COMPARISON: 31 January 2009 HISTORY: ORDERING SYSTEM PROVIDED HISTORY: fall TECHNOLOGIST PROVIDED HISTORY: Ordering Physician Provided Reason for Exam: pt states she fell yesterday, having headaches/dizziness and confusion today Acuity: Acute Type of Exam: Initial FINDINGS: BRAIN/VENTRICLES: There is no acute intracranial hemorrhage, mass effect or midline shift. No abnormal extra-axial fluid collection. The gray-white differentiation is maintained without evidence of an acute infarct. There is no evidence of hydrocephalus. A tiny remote left basal ganglial infarct is noted. ORBITS: The visualized portion of the orbits demonstrate no acute abnormality. SINUSES: Mild mucoperiosteal thickening involves the ethmoid air cells and right sphenoid sinus. Mastoid air cells are appropriately aerated. SOFT TISSUES/SKULL:  No acute abnormality of the visualized skull or soft tissues. Estimated biologic radiation dose for this procedure:952.11 mGy/cm2. No acute intracranial abnormality. Mild sinus inflammation as above. Assessment :      Primary Problem  <principal problem not specified>    Active Hospital Problems    Diagnosis Date Noted    ANYA (acute kidney injury) ([facility] [N17.9] 09/07/2018       Plan:     Patient status Admit as inpatient in the  Progressive Unit/Step down    1. ANYA - Unclear etiology. Had poor po intake, also recently stopped ciprofloxacin 2 days ago, taking NSAIDs and oxycodone. Had a previous work up 8/16 : CRP 18.9, ESR 32, RF negative, HLA b27 negative, HEMANTH equivocal    Cr 1.88, and GFR 34.   On 8/17, GFR >60, and Cr 0.56  1. UA with leukocyte esterase, bacteria, symptomatic, culture pendng  2. Monitor I&O, UOP  3. Continue IVF    Right lower extremity chronic wound - wound care is consulted    DVT ppx - scds, heparin prophylactic    Consultations:   IP CONSULT TO INTERNAL MEDICINE  IP CONSULT TO Javi     Patient is admitted as inpatient status because of co-morbidities listed above, severity of signs and symptoms as outlined, requirement for current medical therapies and most importantly because of direct risk to patient if care not provided in a hospital setting.     Ellis Alfred DO  9/7/2018  4:21 PM    Copy sent to Dr. Samantha Jin MD

## 2018-09-07 NOTE — ED PROVIDER NOTES
REFLEX           EMERGENCY DEPARTMENT COURSE:   Vitals:    Vitals:    09/07/18 0915 09/07/18 0930 09/07/18 1052   BP: 103/81 138/87 (!) 134/92   Pulse: 78  70   Resp: 18  16   Temp: 98.6 °F (37 °C)     TempSrc: Oral     SpO2: 99%  98%   Weight: 183 lb (83 kg)     Height: 5' 4\" (1.626 m)       -------------------------  BP: (!) 134/92, Temp: 98.6 °F (37 °C), Pulse: 70, Resp: 16      FINAL IMPRESSION      1.  ANYA (acute kidney injury) [facility])          [address] Admitted 09/07/2018 12:22:27 PM      (Please note that portions of this note were completed with a voice recognition program.  Efforts were made to edit the dictations but occasionally words are mis-transcribed.)    Stefanie Marquez MD, SHANON, [address] [facility],[address]  Attending Emergency Physician                     Stefanie Marquez MD  09/07/18 0421

## 2018-09-07 NOTE — FLOWSHEET NOTE
09/07/18 1345   Encounter Summary   Services provided to: Patient   Referral/Consult From: [facility]   Support System Children   Continue Visiting (9-7-18)   Complexity of Encounter Moderate   Length of Encounter 15 minutes   Spiritual Assessment Completed Yes   Routine   Type Initial   Spiritual/Nondenominational   Type Spiritual support   Assessment Approachable   Intervention Active listening;Explored feelings, thoughts, concerns;Prayer;Sustaining presence/ Ministry of presence   Outcome Expressed gratitude;Expressed feelings/needs/concerns

## 2018-09-08 LAB
ABSOLUTE EOS #: 0.1 K/UL (ref 0–0.4)
ABSOLUTE IMMATURE GRANULOCYTE: ABNORMAL K/UL (ref 0–0.3)
ABSOLUTE LYMPH #: 1.7 K/UL (ref 1–4.8)
ABSOLUTE MONO #: 0.4 K/UL (ref 0.1–1.3)
ANION GAP SERPL CALCULATED.3IONS-SCNC: 13 MMOL/L (ref 9–17)
BASOPHILS # BLD: 1 % (ref 0–2)
BASOPHILS ABSOLUTE: 0 K/UL (ref 0–0.2)
BUN BLDV-MCNC: 19 MG/DL (ref 6–20)
BUN/CREAT BLD: ABNORMAL (ref 9–20)
CALCIUM SERPL-MCNC: 9.1 MG/DL (ref 8.6–10.4)
CHLORIDE BLD-SCNC: 107 MMOL/L (ref 98–107)
CO2: 23 MMOL/L (ref 20–31)
CREAT SERPL-MCNC: 1.9 MG/DL (ref 0.5–0.9)
CULTURE: NORMAL
DIFFERENTIAL TYPE: ABNORMAL
EOSINOPHIL,URINE: NORMAL
EOSINOPHILS RELATIVE PERCENT: 1 % (ref 0–4)
GFR AFRICAN AMERICAN: 33 ML/MIN
GFR NON-AFRICAN AMERICAN: 27 ML/MIN
GFR SERPL CREATININE-BSD FRML MDRD: ABNORMAL ML/MIN/{1.73_M2}
GFR SERPL CREATININE-BSD FRML MDRD: ABNORMAL ML/MIN/{1.73_M2}
GLUCOSE BLD-MCNC: 98 MG/DL (ref 70–99)
HCT VFR BLD CALC: 33.8 % (ref 36–46)
HEMOGLOBIN: 10.9 G/DL (ref 12–16)
IMMATURE GRANULOCYTES: ABNORMAL %
LYMPHOCYTES # BLD: 29 % (ref 24–44)
Lab: NORMAL
MCH RBC QN AUTO: 28.8 PG (ref 26–34)
MCHC RBC AUTO-ENTMCNC: 32.4 G/DL (ref 31–37)
MCV RBC AUTO: 89 FL (ref 80–100)
MONOCYTES # BLD: 8 % (ref 1–7)
NRBC AUTOMATED: ABNORMAL PER 100 WBC
PDW BLD-RTO: 15.8 % (ref 11.5–14.9)
PLATELET # BLD: 340 K/UL (ref 150–450)
PLATELET ESTIMATE: ABNORMAL
PMV BLD AUTO: 8.6 FL (ref 6–12)
POTASSIUM SERPL-SCNC: 4 MMOL/L (ref 3.7–5.3)
RBC # BLD: 3.79 M/UL (ref 4–5.2)
RBC # BLD: ABNORMAL 10*6/UL
SEG NEUTROPHILS: 61 % (ref 36–66)
SEGMENTED NEUTROPHILS ABSOLUTE COUNT: 3.7 K/UL (ref 1.3–9.1)
SODIUM BLD-SCNC: 143 MMOL/L (ref 135–144)
SODIUM,UR: 72 MMOL/L
SPECIMEN DESCRIPTION: NORMAL
STATUS: NORMAL
WBC # BLD: 5.9 K/UL (ref 3.5–11)
WBC # BLD: ABNORMAL 10*3/UL

## 2018-09-08 PROCEDURE — 6360000002 HC RX W HCPCS: Performed by: STUDENT IN AN ORGANIZED HEALTH CARE EDUCATION/TRAINING PROGRAM

## 2018-09-08 PROCEDURE — 97162 PT EVAL MOD COMPLEX 30 MIN: CPT

## 2018-09-08 PROCEDURE — 87205 SMEAR GRAM STAIN: CPT

## 2018-09-08 PROCEDURE — 6360000002 HC RX W HCPCS: Performed by: FAMILY MEDICINE

## 2018-09-08 PROCEDURE — 6370000000 HC RX 637 (ALT 250 FOR IP): Performed by: FAMILY MEDICINE

## 2018-09-08 PROCEDURE — 6370000000 HC RX 637 (ALT 250 FOR IP): Performed by: INTERNAL MEDICINE

## 2018-09-08 PROCEDURE — 36415 COLL VENOUS BLD VENIPUNCTURE: CPT

## 2018-09-08 PROCEDURE — 80048 BASIC METABOLIC PNL TOTAL CA: CPT

## 2018-09-08 PROCEDURE — 99233 SBSQ HOSP IP/OBS HIGH 50: CPT | Performed by: INTERNAL MEDICINE

## 2018-09-08 PROCEDURE — 85025 COMPLETE CBC W/AUTO DIFF WBC: CPT

## 2018-09-08 PROCEDURE — 2580000003 HC RX 258: Performed by: STUDENT IN AN ORGANIZED HEALTH CARE EDUCATION/TRAINING PROGRAM

## 2018-09-08 PROCEDURE — 84300 ASSAY OF URINE SODIUM: CPT

## 2018-09-08 PROCEDURE — 1200000000 HC SEMI PRIVATE

## 2018-09-08 PROCEDURE — 97116 GAIT TRAINING THERAPY: CPT

## 2018-09-08 PROCEDURE — 2580000003 HC RX 258: Performed by: INTERNAL MEDICINE

## 2018-09-08 RX ORDER — SODIUM CHLORIDE 450 MG/100ML
INJECTION, SOLUTION INTRAVENOUS CONTINUOUS
Status: DISCONTINUED | OUTPATIENT
Start: 2018-09-08 | End: 2018-09-09 | Stop reason: HOSPADM

## 2018-09-08 RX ORDER — ZOLPIDEM TARTRATE 10 MG/1
10 TABLET ORAL NIGHTLY PRN
Status: DISCONTINUED | OUTPATIENT
Start: 2018-09-08 | End: 2018-09-09 | Stop reason: HOSPADM

## 2018-09-08 RX ADMIN — SODIUM CHLORIDE: 9 INJECTION, SOLUTION INTRAVENOUS at 10:01

## 2018-09-08 RX ADMIN — VITAMIN D, TAB 1000IU (100/BT) 1000 UNITS: 25 TAB at 09:58

## 2018-09-08 RX ADMIN — OXYCODONE HYDROCHLORIDE AND ACETAMINOPHEN 1 TABLET: 5; 325 TABLET ORAL at 18:32

## 2018-09-08 RX ADMIN — ZOLPIDEM TARTRATE 10 MG: 10 TABLET, FILM COATED ORAL at 20:56

## 2018-09-08 RX ADMIN — SODIUM CHLORIDE: 4.5 INJECTION, SOLUTION INTRAVENOUS at 15:15

## 2018-09-08 RX ADMIN — ASPIRIN 81 MG: 81 TABLET, COATED ORAL at 09:58

## 2018-09-08 RX ADMIN — HEPARIN SODIUM 5000 UNITS: 5000 INJECTION, SOLUTION INTRAVENOUS; SUBCUTANEOUS at 05:44

## 2018-09-08 RX ADMIN — OXYCODONE HYDROCHLORIDE AND ACETAMINOPHEN 1 TABLET: 5; 325 TABLET ORAL at 09:58

## 2018-09-08 RX ADMIN — CEFTRIAXONE SODIUM 1 G: 1 INJECTION, POWDER, FOR SOLUTION INTRAMUSCULAR; INTRAVENOUS at 18:32

## 2018-09-08 RX ADMIN — OXYCODONE HYDROCHLORIDE AND ACETAMINOPHEN 1 TABLET: 5; 325 TABLET ORAL at 02:27

## 2018-09-08 RX ADMIN — SODIUM CHLORIDE: 4.5 INJECTION, SOLUTION INTRAVENOUS at 22:14

## 2018-09-08 RX ADMIN — HEPARIN SODIUM 5000 UNITS: 5000 INJECTION, SOLUTION INTRAVENOUS; SUBCUTANEOUS at 20:56

## 2018-09-08 RX ADMIN — HEPARIN SODIUM 5000 UNITS: 5000 INJECTION, SOLUTION INTRAVENOUS; SUBCUTANEOUS at 14:55

## 2018-09-08 ASSESSMENT — PAIN SCALES - GENERAL
PAINLEVEL_OUTOF10: 0
PAINLEVEL_OUTOF10: 7
PAINLEVEL_OUTOF10: 8
PAINLEVEL_OUTOF10: 8
PAINLEVEL_OUTOF10: 3
PAINLEVEL_OUTOF10: 7

## 2018-09-08 ASSESSMENT — PAIN DESCRIPTION - PAIN TYPE
TYPE: CHRONIC PAIN
TYPE: ACUTE PAIN;CHRONIC PAIN

## 2018-09-08 ASSESSMENT — PAIN DESCRIPTION - LOCATION
LOCATION: HIP;LEG
LOCATION: HIP

## 2018-09-08 ASSESSMENT — PAIN DESCRIPTION - ORIENTATION: ORIENTATION: RIGHT;LEFT

## 2018-09-08 NOTE — CARE COORDINATION
CASE MANAGEMENT NOTE:    Admission Date:  9/7/2018 Ramses Condon is a 62 y.o.  female    Admitted for : ANYA (acute kidney injury) ([facility] [address] [N17.9]  ANYA (acute kidney injury) ([facility] [address] [N17.9]  Acute kidney injury ([facility] [address] [N17.9]    Met with:  Patient    PCP:  James Reyes 75:  [employer]      Current Residence/ Living Arrangements:  independently at home , Lives alone            Current Services PTA:  Yes, Current at [address]    Is patient agreeable to VNS: No    Freedom of choice provided: Yes    List of [address] provided: No, Declines    VNS chosen:  No    DME:  straight cane, If needed    Home Oxygen: No    Nebulizer: No    CPAP/BIPAP: No    Supplier: N/A    Potential Assistance Needed: Yes, Follow for Otto Diane    SNF needed: No    Pharmacy:  [facility]       Does Patient want to use MEDS to BEDS? No    Family Members/Caregivers that pt would like involved in their care:    Yes    If yes, list name here[facility]  [facility]    Transportation Provider:  Patient                      Discharge Plan:  9/8/18 BC Pt. Lives in upper duplex, W/ Steps, Alone. DME Cane if needed. Follow for Otto Diane. Current w/ [address]. PT/OT, Cr. 1.90, Renal to see. IV Rocephin.  Denies VNS, will follow rec.//KB                 Electronically signed by: Darek Peres RN on 9/8/2018 at 2:54 PM

## 2018-09-08 NOTE — FLOWSHEET NOTE
AD document given to patient per her request; patient did not want to execute documents at this time; patient will advise SC with further needs or concerns in this regard; patient anxious as she discussed her medical circumstances and the loss of her ; patient has a strong naif in [facility] and was encouraged and comforted by prayer and Marcia corbett; listening presence and support provided;     09/08/18 1305   Encounter Summary   Services provided to: Patient   Referral/Consult From: Patient;Rounding   Continue Visiting (9/8/18)   Complexity of Encounter Moderate   Length of Encounter 30 minutes   Spiritual Assessment Completed Yes   Routine   Type Follow up   Spiritual/Denominational   Type Spiritual support   Assessment Approachable; Anxious; Helplessness; Hopeful;Coping   Intervention Active listening;Explored feelings, thoughts, concerns;Nurtured hope;Prayer;Sustaining presence/ Ministry of presence; Discussed belief system/Moravian practices/naif;Discussed illness/injury and it's impact   Outcome Comfort;Expressed gratitude;Engaged in conversation;Expressed feelings/needs/concerns;Coping; Hopeful;Receptive   Advance Directives (For Healthcare)   Healthcare Directive No, patient does not have an advance directive for healthcare treatment   Patient Requests Assistance Yes, referral made to    Advance Directives Documents given

## 2018-09-08 NOTE — PLAN OF CARE
Problem: Falls - Risk of:  Goal: Will remain free from falls  Will remain free from falls   Outcome: Ongoing  Patient has been free from falls this shift. Call light is within reach, side rails up x2, bed in lowest position. Problem: Pain:  Goal: Pain level will decrease  Pain level will decrease   Outcome: Ongoing  Pain level has decreased with prescribed pain medications.

## 2018-09-09 VITALS
RESPIRATION RATE: 18 BRPM | SYSTOLIC BLOOD PRESSURE: 125 MMHG | DIASTOLIC BLOOD PRESSURE: 89 MMHG | BODY MASS INDEX: 31.24 KG/M2 | HEIGHT: 64 IN | OXYGEN SATURATION: 100 % | TEMPERATURE: 98.4 F | HEART RATE: 76 BPM | WEIGHT: 183 LBS

## 2018-09-09 LAB
% CKMB: 1.7 % (ref 0–3)
ABSOLUTE EOS #: 0.1 K/UL (ref 0–0.4)
ABSOLUTE IMMATURE GRANULOCYTE: ABNORMAL K/UL (ref 0–0.3)
ABSOLUTE LYMPH #: 1.9 K/UL (ref 1–4.8)
ABSOLUTE MONO #: 0.4 K/UL (ref 0.1–1.3)
ANION GAP SERPL CALCULATED.3IONS-SCNC: 13 MMOL/L (ref 9–17)
BASOPHILS # BLD: 1 % (ref 0–2)
BASOPHILS ABSOLUTE: 0 K/UL (ref 0–0.2)
BUN BLDV-MCNC: 13 MG/DL (ref 6–20)
BUN/CREAT BLD: ABNORMAL (ref 9–20)
CALCIUM SERPL-MCNC: 9 MG/DL (ref 8.6–10.4)
CHLORIDE BLD-SCNC: 108 MMOL/L (ref 98–107)
CK MB: <1 NG/ML
CKMB INTERPRETATION: NORMAL
CO2: 23 MMOL/L (ref 20–31)
CREAT SERPL-MCNC: 1.4 MG/DL (ref 0.5–0.9)
DIFFERENTIAL TYPE: ABNORMAL
EOSINOPHILS RELATIVE PERCENT: 2 % (ref 0–4)
GFR AFRICAN AMERICAN: 47 ML/MIN
GFR NON-AFRICAN AMERICAN: 39 ML/MIN
GFR SERPL CREATININE-BSD FRML MDRD: ABNORMAL ML/MIN/{1.73_M2}
GFR SERPL CREATININE-BSD FRML MDRD: ABNORMAL ML/MIN/{1.73_M2}
GLUCOSE BLD-MCNC: 95 MG/DL (ref 70–99)
HCT VFR BLD CALC: 33.3 % (ref 36–46)
HEMOGLOBIN: 10.7 G/DL (ref 12–16)
IMMATURE GRANULOCYTES: ABNORMAL %
LYMPHOCYTES # BLD: 37 % (ref 24–44)
MCH RBC QN AUTO: 28.7 PG (ref 26–34)
MCHC RBC AUTO-ENTMCNC: 32.2 G/DL (ref 31–37)
MCV RBC AUTO: 89.1 FL (ref 80–100)
MONOCYTES # BLD: 9 % (ref 1–7)
NRBC AUTOMATED: ABNORMAL PER 100 WBC
PDW BLD-RTO: 15.2 % (ref 11.5–14.9)
PLATELET # BLD: 318 K/UL (ref 150–450)
PLATELET ESTIMATE: ABNORMAL
PMV BLD AUTO: 8.5 FL (ref 6–12)
POTASSIUM SERPL-SCNC: 4.1 MMOL/L (ref 3.7–5.3)
RBC # BLD: 3.74 M/UL (ref 4–5.2)
RBC # BLD: ABNORMAL 10*6/UL
SEG NEUTROPHILS: 51 % (ref 36–66)
SEGMENTED NEUTROPHILS ABSOLUTE COUNT: 2.6 K/UL (ref 1.3–9.1)
SODIUM BLD-SCNC: 144 MMOL/L (ref 135–144)
TOTAL CK: 58 U/L (ref 26–192)
WBC # BLD: 5.1 K/UL (ref 3.5–11)
WBC # BLD: ABNORMAL 10*3/UL

## 2018-09-09 PROCEDURE — 51798 US URINE CAPACITY MEASURE: CPT

## 2018-09-09 PROCEDURE — 2580000003 HC RX 258: Performed by: INTERNAL MEDICINE

## 2018-09-09 PROCEDURE — 6370000000 HC RX 637 (ALT 250 FOR IP): Performed by: FAMILY MEDICINE

## 2018-09-09 PROCEDURE — 97116 GAIT TRAINING THERAPY: CPT

## 2018-09-09 PROCEDURE — 6360000002 HC RX W HCPCS: Performed by: FAMILY MEDICINE

## 2018-09-09 PROCEDURE — 82553 CREATINE MB FRACTION: CPT

## 2018-09-09 PROCEDURE — 82550 ASSAY OF CK (CPK): CPT

## 2018-09-09 PROCEDURE — 80048 BASIC METABOLIC PNL TOTAL CA: CPT

## 2018-09-09 PROCEDURE — 6370000000 HC RX 637 (ALT 250 FOR IP): Performed by: INTERNAL MEDICINE

## 2018-09-09 PROCEDURE — 36415 COLL VENOUS BLD VENIPUNCTURE: CPT

## 2018-09-09 PROCEDURE — 99239 HOSP IP/OBS DSCHRG MGMT >30: CPT | Performed by: INTERNAL MEDICINE

## 2018-09-09 PROCEDURE — 85025 COMPLETE CBC W/AUTO DIFF WBC: CPT

## 2018-09-09 RX ORDER — ACETAMINOPHEN 325 MG/1
650 TABLET ORAL EVERY 4 HOURS PRN
Qty: 120 TABLET | Refills: 3 | Status: ON HOLD | OUTPATIENT
Start: 2018-09-09 | End: 2018-11-12 | Stop reason: HOSPADM

## 2018-09-09 RX ADMIN — HEPARIN SODIUM 5000 UNITS: 5000 INJECTION, SOLUTION INTRAVENOUS; SUBCUTANEOUS at 06:03

## 2018-09-09 RX ADMIN — VITAMIN D, TAB 1000IU (100/BT) 1000 UNITS: 25 TAB at 08:28

## 2018-09-09 RX ADMIN — ASPIRIN 81 MG: 81 TABLET, COATED ORAL at 08:28

## 2018-09-09 RX ADMIN — SODIUM CHLORIDE: 4.5 INJECTION, SOLUTION INTRAVENOUS at 06:07

## 2018-09-09 RX ADMIN — OXYCODONE HYDROCHLORIDE AND ACETAMINOPHEN 1 TABLET: 5; 325 TABLET ORAL at 01:30

## 2018-09-09 RX ADMIN — OXYCODONE HYDROCHLORIDE AND ACETAMINOPHEN 1 TABLET: 5; 325 TABLET ORAL at 08:28

## 2018-09-09 ASSESSMENT — PAIN SCALES - GENERAL
PAINLEVEL_OUTOF10: 0
PAINLEVEL_OUTOF10: 8
PAINLEVEL_OUTOF10: 2
PAINLEVEL_OUTOF10: 8
PAINLEVEL_OUTOF10: 5

## 2018-09-09 ASSESSMENT — PAIN DESCRIPTION - LOCATION
LOCATION: HIP
LOCATION: HIP;LEG

## 2018-09-09 ASSESSMENT — PAIN DESCRIPTION - PAIN TYPE
TYPE: ACUTE PAIN;CHRONIC PAIN
TYPE: CHRONIC PAIN

## 2018-09-09 ASSESSMENT — PAIN DESCRIPTION - ORIENTATION: ORIENTATION: RIGHT

## 2018-09-09 NOTE — CONSULTS
NEPHROLOGY CONSULT     Patient :  Haseeb Valle; 62 y.o. MRN# [id number]  Location:  2061/2061-01  Attending:  Eleazar Diehl MD  Admit Date:  9/7/2018   Hospital Day: 2      Reason for Consult: Acute kidney injury      Chief Complaint:  Right hip pain  History Obtained From:  Patient    History of Present Illness: This is a 62 y.o. female with Hx of arthritis on naproxen, presented to the hospital with complains of right hip pain patient fell and missed a step and since then patient was having more hip pain. Denies any fever chills. On initial lab investigation patient was found to have creatinine of 1.9 mg/dL, she has no prior kidney disease  Patient has been taken naproxen for about a year every day.   Patient denied any diarrhea and nausea vomiting  Patient is feeling better now hip pain is improved    Past Medical History:        Diagnosis Date    Arthritis     right knee       Past Surgical History:        Procedure Laterality Date    COLONOSCOPY      [place] INCIS/DRAIN THIGH/KNEE ABSCESS,DEEP Left 8/20/2018    LEG ULCER TISSUE BIOPSY W/CULTURE performed by Jovanny Beckham DPM at [address] [place] [address] Loop WRIST SURGERY Left        Current Medications:      0.45 % sodium chloride infusion Continuous   zolpidem (AMBIEN) tablet 10 mg Nightly PRN   sodium chloride flush 0.9 % injection 10 mL 2 times per day   sodium chloride flush 0.9 % injection 10 mL PRN   acetaminophen (TYLENOL) tablet 650 mg Q4H PRN   sodium chloride flush 0.9 % injection 10 mL 2 times per day   sodium chloride flush 0.9 % injection 10 mL PRN   magnesium hydroxide (MILK OF MAGNESIA) 400 MG/5ML suspension 30 mL Daily PRN   ondansetron (ZOFRAN) injection 4 mg Q6H PRN   cefTRIAXone (ROCEPHIN) 1 g IVPB in 50 mL D5W minibag Q24H   heparin (porcine) injection 5,000 Units 3 times per day   aspirin EC tablet 81 mg Daily   vitamin D (CHOLECALCIFEROL) tablet 1,000 Units Daily   oxyCODONE-acetaminophen (PERCOCET) 5-325 MG per tablet 1 tablet hrs (Last 3 readings):   Weight   09/07/18 0915 183 lb (83 kg)       Physical Exam:  GENERAL APPEARANCE: Alert and cooperative, and appears to be in no acute distress. HEAD: normocephalic  EYES: PERRL, EOMI. Not pale, anicteric   NOSE:  No nasal discharge. THROAT:  Oral cavity and pharynx normal. Moist  CARDIAC: Normal S1 and S2. No S3, S4 or murmurs. Rhythm is regular. LUNGS: Clear to auscultation and percussion without rales, rhonchi, wheezing or diminished breath sounds. NECK: Neck supple, non-tender without lymphadenopathy, masses or thyromegaly. JVD-neg  BACK: Examination of the spine reveals normal gait and posture, no spinal deformity, symmetry of spinal muscles, without tenderness, decreased range of motion or muscular spasm  MUSKULOSKELETAL: Adequately aligned spine. No joint erythema or tenderness. EXTREMITIES: No edema. Peripheral pulses intact. NEURO: Nonfocal      Labs:   CBC:  Recent Labs      09/07/18   0945  09/08/18   0604  09/09/18   0646   WBC  7.5  5.9  5.1   RBC  4.00  3.79*  3.74*   HGB  11.8*  10.9*  10.7*   HCT  35.5*  33.8*  33.3*   MCV  88.8  89.0  89.1   MCH  29.6  28.8  28.7   MCHC  33.4  32.4  32.2   RDW  15.9*  15.8*  15.2*   PLT  349  340  318   MPV  8.2  8.6  8.5      BMP: Recent Labs      09/07/18   1019  09/08/18   0604  09/09/18   0646   NA  141  143  144   K  4.5  4.0  4.1   CL  105  107  108*   CO2  24  23  23   BUN  18  19  13   CREATININE  1.88*  1.90*  1.40*   GLUCOSE  103*  98  95   CALCIUM  9.7  9.1  9.0        Urine Sodium:    Recent Labs      09/08/18   1847   ESHA  72      Urine Potassium: No results for input(s): KUR in the last 72 hours. Urine Chloride: Invalid input(s): CLU  Urine Ph:  Invalid input(s): PO4U  Urine Osmolarity: No results for input(s): OSMOU in the last 72 hours.   Urine Creatinine:    Recent Labs      09/07/18   2019   LABCREA  44.5     Urine Eosinophils: Invalid input(s): EOSU  Urine Protein:  No results for input(s): TPU in the last 72 hours.  Urinalysis:  Recent Labs      09/07/18   1050   NITRU  NEGATIVE   COLORU  YELLOW   PHUR  6.0   WBCUA  2 TO 5   RBCUA  0 TO 2   MUCUS  NOT REPORTED   TRICHOMONAS  NOT REPORTED   YEAST  NOT REPORTED   BACTERIA  FEW*   SPECGRAV  1.008   LEUKOCYTESUR  TRACE*   UROBILINOGEN  Normal   [address]  1+*         Radiology:  Reviewed as available. Assessment:  1. Acute kidney injury most likely secondary to prerenal azotemia leading to ATN due to NSAID use, also rule out acute interstitial nephritis. UA unremarkable no protein no blood trace leukocyte esterase  2. Serum creatinine has improved to 1.4 mg/dL       Plan:  1. Comprehensive urine testing including Urinalysis, Urine sodium, potassium, chloride, Urine protein and creatinine to quantify the proteinuria if present. Will check urinary eosinophils. 2.  Check Post void bladder scan and Renal Ultrasound to assess for urinary obstruction and to assess the kidney size, echogenicity. No objections on discharge will follow-up as an outpatient. Patient can get kidney ultrasound as an outpatient  Avoid NSAIDs and other nephrotoxic agents        Thank you for the consultation.       Electronically signed by Zulay العلي MD on 9/9/2018 at 12:06 PM

## 2018-09-09 NOTE — DISCHARGE SUMMARY
without the administration of intravenous contrast. Dose modulation, iterative reconstruction, and/or weight based adjustment of the mA/kV was utilized to reduce the radiation dose to as low as reasonably achievable. COMPARISON: 31 January 2009 HISTORY: ORDERING SYSTEM PROVIDED HISTORY: fall TECHNOLOGIST PROVIDED HISTORY: Ordering Physician Provided Reason for Exam: pt states she fell yesterday, having headaches/dizziness and confusion today Acuity: Acute Type of Exam: Initial FINDINGS: BRAIN/VENTRICLES: There is no acute intracranial hemorrhage, mass effect or midline shift. No abnormal extra-axial fluid collection. The gray-white differentiation is maintained without evidence of an acute infarct. There is no evidence of hydrocephalus. A tiny remote left basal ganglial infarct is noted. ORBITS: The visualized portion of the orbits demonstrate no acute abnormality. SINUSES: Mild mucoperiosteal thickening involves the ethmoid air cells and right sphenoid sinus. Mastoid air cells are appropriately aerated. SOFT TISSUES/SKULL:  No acute abnormality of the visualized skull or soft tissues. Estimated biologic radiation dose for this procedure:952.11 mGy/cm2. No acute intracranial abnormality. Mild sinus inflammation as above. Ct Pelvis Wo Contrast Additional Contrast? None    Result Date: 9/7/2018  EXAMINATION: CT OF THE PELVIS WITHOUT CONTRAST 9/7/2018 8:52 pm TECHNIQUE: CT of the pelvis was performed without the administration of intravenous contrast.  Multiplanar reformatted images are provided for review. Dose modulation, iterative reconstruction, and/or weight based adjustment of the mA/kV was utilized to reduce the radiation dose to as low as reasonably achievable. COMPARISON: None. HISTORY: ORDERING SYSTEM PROVIDED HISTORY: right hip severe tenderness TECHNOLOGIST PROVIDED HISTORY: Ordering Physician Provided Reason for Exam: Right hip severe tenderness. S/P Fall on 09/07/18.  Acuity: Acute Type of

## 2018-09-09 NOTE — CARE COORDINATION
ONGOING DISCHARGE PLAN:    Spoke with patient regarding discharge plan and patient confirms that plan is still to DC home w/ no need for VNS. Did want wheeled walker, [facility] is following, will have delivered to her home. Writer faxed, DME order/Face sheet to [phone]. Will continue to follow for additional discharge needs.     Electronically signed by Rae May RN on 9/9/2018 at 11:49 AM

## 2018-09-09 NOTE — PROGRESS NOTES
Dr. Korina Amezcua paged. Awaiting call back.
Notified Dr. Taurus Aquino of consult and new orders received. Will continue to monitor.
Physical Therapy  Facility/Department: [facility] MED SURG  Daily Treatment Note  NAME: Cristina Chun  : 1960  MRN: [id number]    Date of Service: 2018    Discharge Recommendations:  Home with Home health PT, Home with assist PRN   PT Equipment Recommendations  Mobility Devices: Walker    Patient Diagnosis(es): The encounter diagnosis was ANYA (acute kidney injury) ([address] has a past medical history of Arthritis. has a past surgical history that includes Colonoscopy; Wrist surgery (Left); Tubal ligation; and pr incis/drain thigh/knee abscess,deep (Left, 2018). Restrictions  Restrictions/Precautions  Restrictions/Precautions: General Precautions, Fall Risk  Subjective   General  Response To Previous Treatment: Patient with no complaints from previous session.   Family / Caregiver Present: No  Pain Screening  Patient Currently in Pain: Yes  Pain Assessment  Pain Assessment: 0-10  Pain Level: 5  Pain Type: Acute pain;Chronic pain  Pain Location: Hip  Pain Orientation: Right  Vital Signs  Patient Currently in Pain: Yes  Pre Treatment Pain Screening  Pain at present: 5  Scale Used: Numeric Score    Orientation  Orientation  Overall Orientation Status: Within Normal Limits  Objective   Bed mobility  Scooting: Independent  Transfers  Sit to Stand: Modified independent  Stand to sit: Modified independent  Stand Pivot Transfers: Modified independent  Ambulation  Ambulation?: Yes  Ambulation 1  Surface: level tile  Device: Rolling Walker  Assistance: Supervision  Quality of Gait: antalgic gait (R) LE  Distance: 75' x 2  Stairs/Curb  Stairs?: Yes  Stairs  # Steps : 6  Stairs Height: 8\"  Rails: Right ascending  Device: No Device  Assistance: Supervision  Comment: 6 steps x 2 reps; educated on proper one step at a time sequence to reduce stress on (R) hip and minimize fall risk     Balance  Sitting - Static: Good  Sitting - Dynamic: Good  Standing - Static: Fair;+  Standing - Dynamic: Fair;+
Pt arrived to floor via stretcher from ED and was transfered to bed. Vitals taken. Admission and assessment complete. No distress noted. See doc flowsheet and admission navigator for details. POC and education initiated and reviewed with patient. Call light within reach, and pt educated on its use. Bed in lowest position, and locked. Side rails up x 2. Denied further questions or needs at this time. Will continue to monitor.
lightheadedness/orthostatic symptoms ,chest pain, dyspnea on exertion, palpitations or loss of consciousness. · Respiratory: Negative for cough or wheezing, sputum production, hemoptysis, pleuritic pain. · Gastrointestinal: Negative for nausea/vomiting, change in bowel habits, pos abdominal pain, dysphagia/appetite loss, hematemesis, blood in stools. · Genitourinary:Negative for change in bladder habits, dysuria, trouble voiding, hematuria. · Musculoskeletal: Negative for gait disturbance, weakness, joint complaints. pos pelvic pain  · Integumentary: Negative for rash, pruritis. · Neurological: Negative for headache, dizziness, change in muscle strength, numbness/tingling, change in gait, balance, coordination,   · Endocrine: negative for temperature intolerance, excessive thirst, fluid intake, or urination, tremor. · Hematologic/Lymphatic: negative for abnormal bruising or bleeding, blood clots, swollen lymph nodes. PHYSICAL EXAM      /76   Pulse 84   Temp 97 °F (36.1 °C) (Oral)   Resp 18   Ht 5' 4\" (1.626 m)   Wt 183 lb (83 kg)   LMP  (LMP Unknown)   SpO2 100%   BMI 31.41 kg/m²      · General appearance: well nourished  · HEENT: Head: Normocephalic, no lesions, without obvious abnormality. · Lungs: clear to auscultation bilaterally  · Heart: regular rate and rhythm, S1, S2 normal, no murmur, click, rub or gallop  · Abdomen: soft, non-tender; bowel sounds normal; no masses,  no organomegaly  · Extremities: extremities normal, atraumatic, no cyanosis or edema  · Neurological: Gait normal. Reflexes normal and symmetric.  Sensation grossly normal  · Skin - no rash, no lump   · Eye no icterus no redness  · Psych-normal affect   · NEURO-no limb weakness  No facial droop  · Lymphatic system-no lymphadenopathy no splenomegaly     DIAGNOSTICS      Laboratory Testing:  CBC:   Recent Labs      09/08/18   0604   WBC  5.9   HGB  10.9*   PLT  340     BMP:    Recent Labs      09/07/18   1019  09/08/18
Becca Camacho, PT

## 2018-09-11 LAB — GLUCOSE BLD-MCNC: 103 MG/DL (ref 65–105)

## 2018-09-17 ENCOUNTER — HOSPITAL ENCOUNTER (OUTPATIENT)
Dept: WOUND CARE | Age: 58
Discharge: HOME | End: 2018-09-17
Payer: COMMERCIAL

## 2018-09-17 VITALS
SYSTOLIC BLOOD PRESSURE: 147 MMHG | WEIGHT: 183 LBS | HEIGHT: 64 IN | DIASTOLIC BLOOD PRESSURE: 100 MMHG | BODY MASS INDEX: 31.24 KG/M2 | TEMPERATURE: 98.3 F | RESPIRATION RATE: 18 BRPM | HEART RATE: 84 BPM

## 2018-09-17 PROCEDURE — 99213 OFFICE O/P EST LOW 20 MIN: CPT

## 2018-09-17 PROCEDURE — 6370000000 HC RX 637 (ALT 250 FOR IP): Performed by: NURSE PRACTITIONER

## 2018-09-17 PROCEDURE — 99212 OFFICE O/P EST SF 10 MIN: CPT | Performed by: NURSE PRACTITIONER

## 2018-09-17 RX ORDER — LIDOCAINE HYDROCHLORIDE 40 MG/ML
SOLUTION TOPICAL ONCE
Status: COMPLETED | OUTPATIENT
Start: 2018-09-17 | End: 2018-09-17

## 2018-09-17 RX ADMIN — LIDOCAINE HYDROCHLORIDE 5 ML: 40 SOLUTION TOPICAL at 13:38

## 2018-09-17 ASSESSMENT — PAIN SCALES - GENERAL: PAINLEVEL_OUTOF10: 0

## 2018-09-18 LAB
CULTURE: NORMAL
Lab: NORMAL
SPECIMEN DESCRIPTION: NORMAL
STATUS: NORMAL

## 2018-09-25 ENCOUNTER — HOSPITAL ENCOUNTER (OUTPATIENT)
Dept: WOUND CARE | Age: 58
Discharge: HOME | End: 2018-09-25
Payer: COMMERCIAL

## 2018-09-25 VITALS
BODY MASS INDEX: 31.24 KG/M2 | WEIGHT: 183 LBS | TEMPERATURE: 97.4 F | HEIGHT: 64 IN | RESPIRATION RATE: 18 BRPM | DIASTOLIC BLOOD PRESSURE: 88 MMHG | SYSTOLIC BLOOD PRESSURE: 130 MMHG | HEART RATE: 87 BPM

## 2018-09-25 DIAGNOSIS — L97.922 NON-PRESSURE CHRONIC ULCER OF LOWER LEG, LEFT, WITH FAT LAYER EXPOSED (HCC): Primary | Chronic | ICD-10-CM

## 2018-09-25 DIAGNOSIS — M25.551 RIGHT HIP PAIN: ICD-10-CM

## 2018-09-25 DIAGNOSIS — I87.2 VENOUS INSUFFICIENCY OF LEFT LOWER EXTREMITY: ICD-10-CM

## 2018-09-25 PROCEDURE — 99213 OFFICE O/P EST LOW 20 MIN: CPT | Performed by: PODIATRIST

## 2018-09-25 PROCEDURE — 99212 OFFICE O/P EST SF 10 MIN: CPT

## 2018-09-25 RX ORDER — LIDOCAINE HYDROCHLORIDE 40 MG/ML
SOLUTION TOPICAL ONCE
Status: DISCONTINUED | OUTPATIENT
Start: 2018-09-25 | End: 2018-09-26 | Stop reason: HOSPADM

## 2018-09-25 ASSESSMENT — PAIN SCALES - GENERAL: PAINLEVEL_OUTOF10: 0

## 2018-09-25 NOTE — PLAN OF CARE
Problem: Falls - Risk of:  Goal: Will remain free from falls  Will remain free from falls   Outcome: Completed Date Met: 09/25/18

## 2018-09-25 NOTE — PROGRESS NOTES
Prescriptions on File Prior to Encounter   Medication Sig Dispense Refill    acetaminophen (TYLENOL) 325 MG tablet Take 2 tablets by mouth every 4 hours as needed for Pain 120 tablet 3    Multiple Vitamins-Minerals (THERAPEUTIC MULTIVITAMIN-MINERALS) tablet Take 1 tablet by mouth daily      vitamin D (CHOLECALCIFEROL) 1000 UNIT TABS tablet Take 1,000 Units by mouth daily      zolpidem (AMBIEN) 10 MG tablet Take 10 mg by mouth nightly as needed for Sleep. Tre Rogers aspirin 81 MG tablet Take 81 mg by mouth daily       No current facility-administered medications on file prior to encounter. REVIEW OF SYSTEMS    Pertinent items are noted in HPI.     Objective:     Patient Active Problem List   Diagnosis Code    Non-pressure chronic ulcer of lower leg, left, with fat layer exposed ([facility] L97.922    Pyoderma gangrenosum L88    Leg edema, left R60.0    Acute post-operative pain G89.18    Venous insufficiency of left lower extremity I87.2    Acute kidney injury (HCC) N17.9    Right hip pain M25.551        /88   Pulse 87   Temp 97.4 °F (36.3 °C) (Tympanic)   Resp 18   Ht 5' 4\" (1.626 m)   Wt 183 lb (83 kg)   LMP  (LMP Unknown)   BMI 31.41 kg/m²       Wt Readings from Last 3 Encounters:   09/25/18 183 lb (83 kg)   09/17/18 183 lb (83 kg)   09/07/18 183 lb (83 kg)       PHYSICAL EXAM    General Appearance: alert and oriented to person, place and time, well-developed and well-nourished, in no acute distress  Skin: warm and dry and epithelialized wound to left lower anterior leg, minimal drainage noted, no surrounding erythema or odor      Assessment:      Patient Active Problem List   Diagnosis Code    Non-pressure chronic ulcer of lower leg, left, with fat layer exposed ([facility] L97.922    Pyoderma gangrenosum L88    Leg edema, left R60.0    Acute post-operative pain G89.18    Venous insufficiency of left lower extremity I87.2    Acute kidney injury ([facility] N17.9    Right hip pain M25.551 Procedure Note  Indications:  Based on my examination of this patient's wound(s)/ulcer(s) today, debridement is not required to promote healing and evaluate the wound base. Post Debridement Measurements:  Wound/Ulcer Descriptions are Pre Debridement except measurements:  Wound 08/17/18 Leg Left;Medial;Lower #1 LEFT LOWER LEG, 2 weeks ago (Active)   Wound Image   9/17/2018  1:22 PM   Wound Type Wound 9/25/2018  9:20 AM   Wound Venous 9/7/2018  8:06 PM   Dressing Status Other (Comment) 9/17/2018  1:22 PM   Dressing Changed Changed/New 9/25/2018  9:20 AM   Dressing/Treatment Dry dressing;4x4;Ace Wrap 9/8/2018  8:05 PM   Wound Cleansed Rinsed/Irrigated with saline 9/25/2018  9:20 AM   Wound Length (cm) 0 cm 9/25/2018  9:20 AM   Wound Width (cm) 0 cm 9/25/2018  9:20 AM   Wound Depth (cm)  0 9/25/2018  9:20 AM   Calculated Wound Size (cm^2) (l*w) 0 cm^2 9/25/2018  9:20 AM   Change in Wound Size % (l*w) 100 9/25/2018  9:20 AM   Wound Assessment Brown;Red 9/25/2018  9:20 AM   Drainage Amount BILL 9/17/2018  1:22 PM   Drainage Description BILL 9/17/2018  1:22 PM   Odor None 9/17/2018  1:22 PM   Margins Defined edges 9/17/2018  1:22 PM   Herlinda-wound Assessment Clean;Dry; Intact 9/17/2018  1:22 PM   Gonzales%Wound Bed 0 9/17/2018  1:22 PM   Red%Wound Bed 2 9/17/2018  1:22 PM   Yellow%Wound Bed 0 9/17/2018  1:22 PM   Black%Wound Bed 98 9/17/2018  1:22 PM   Other%Wound Bed 10%white 8/24/2018  9:52 AM   Number of days: 38        Percent of Wound(s)/Ulcer(s) Debrided: 0%    Total Surface Area Debrided:  0 sq cm     Plan:   Rx compression stockings  Referral to vascular surgery for venous stasis  Referral to Ortho for left hip pain     Orders Placed This Encounter   Medications    lidocaine (XYLOCAINE) 4 % external solution    Elastic Bandages & Supports (JOBST KNEE HIGH COMPRESSION SM) MISC     Sig: Dispense Bilateral Jobst Below Knee 20-30mmHg compression stockings     Dispense:  3 each     Refill:  0     Orders Placed This Encounter   Procedures   Casimiro Haddad MD, [facility]*     Referral Priority:   Routine     Referral Type:   Consult for Advice and Opinion     Referral Reason:   Specialty Services Required     Referred to Provider:   Juan Manuel Veras MD     Requested Specialty:   Orthopedic Surgery     Number of Visits Requested:   [address] for Yamila Melendrez MD     Referral Priority:   Routine     Referral Type:   Consult for Advice and Opinion     Referral Reason:   Specialty Services Required     Referred to Provider:   Genie Hendrickson MD     Requested Specialty:   Vascular Surgery     Number of Visits Requested:   1        Written patient dismissal instructions given to patient and signed by patient or POA. Discharge Instructions         [address] [facility] and HY[facility]  CENTER                                 Visit [place] Instructions / Physician Orders  DATE: 09/25/18     Home Care:     SUPPLIES ORDERED THRU:     Wound Location:  Left medial lower leg     Cleanse with: keep dry and intact     Dressing Orders: Apply fibracol then adaptic to wound bed, cover with silver alginate, the apply zinc unna boot     Frequency: keep intact     Additional Orders: Increase protein to diet (meat, cheese, eggs, fish, peanut butter, nuts and beans)  Multivitamin daily     Patient is to remain off work until 10/06/18     HYPERBARIC TREATMENT-     NA           TREATMENT # NA     Your next appointment with [address] [place] [address] is in 1 week with Dr Cuong Paul        TIME [x] 45 MIN     [] 60 MIN     (Please note your next appointment above and if you are unable to keep, kindly give a 24 hour notice.  Thank you.)     If you experience any of the following, please call the [address] [place] [address] during business hours:  [phone]     * Increase in Pain  * Temperature over 101  * Increase in drainage from your wound  * Drainage with a foul odor  *

## 2018-10-30 ENCOUNTER — HOSPITAL ENCOUNTER (OUTPATIENT)
Dept: WOUND CARE | Age: 58
Discharge: HOME | End: 2018-10-30
Payer: COMMERCIAL

## 2018-10-30 VITALS
DIASTOLIC BLOOD PRESSURE: 87 MMHG | TEMPERATURE: 97.8 F | RESPIRATION RATE: 18 BRPM | HEIGHT: 64 IN | SYSTOLIC BLOOD PRESSURE: 155 MMHG | BODY MASS INDEX: 31.24 KG/M2 | WEIGHT: 183 LBS | HEART RATE: 93 BPM

## 2018-10-30 DIAGNOSIS — I89.0 LYMPHEDEMA OF BOTH LOWER EXTREMITIES: ICD-10-CM

## 2018-10-30 DIAGNOSIS — L97.922 NON-PRESSURE CHRONIC ULCER OF LOWER LEG, LEFT, WITH FAT LAYER EXPOSED (HCC): Primary | Chronic | ICD-10-CM

## 2018-10-30 DIAGNOSIS — I87.2 VENOUS INSUFFICIENCY OF LEFT LOWER EXTREMITY: ICD-10-CM

## 2018-10-30 PROCEDURE — 29580 STRAPPING UNNA BOOT: CPT

## 2018-10-30 PROCEDURE — 99213 OFFICE O/P EST LOW 20 MIN: CPT | Performed by: PODIATRIST

## 2018-10-30 PROCEDURE — 6370000000 HC RX 637 (ALT 250 FOR IP): Performed by: PODIATRIST

## 2018-10-30 PROCEDURE — 99213 OFFICE O/P EST LOW 20 MIN: CPT

## 2018-10-30 RX ORDER — LIDOCAINE HYDROCHLORIDE 40 MG/ML
SOLUTION TOPICAL ONCE
Status: COMPLETED | OUTPATIENT
Start: 2018-10-30 | End: 2018-10-30

## 2018-10-30 RX ADMIN — LIDOCAINE HYDROCHLORIDE 5 ML: 40 SOLUTION TOPICAL at 08:54

## 2018-10-30 ASSESSMENT — PAIN DESCRIPTION - PROGRESSION: CLINICAL_PROGRESSION: NOT CHANGED

## 2018-10-30 ASSESSMENT — PAIN DESCRIPTION - FREQUENCY: FREQUENCY: CONTINUOUS

## 2018-10-30 ASSESSMENT — PAIN SCALES - GENERAL: PAINLEVEL_OUTOF10: 10

## 2018-10-30 ASSESSMENT — PAIN DESCRIPTION - ORIENTATION: ORIENTATION: LEFT;LOWER

## 2018-10-30 ASSESSMENT — PAIN DESCRIPTION - PAIN TYPE: TYPE: CHRONIC PAIN

## 2018-10-30 ASSESSMENT — PAIN DESCRIPTION - DESCRIPTORS: DESCRIPTORS: SHARP;CONSTANT

## 2018-10-30 ASSESSMENT — PAIN DESCRIPTION - ONSET: ONSET: ON-GOING

## 2018-10-30 ASSESSMENT — PAIN DESCRIPTION - LOCATION: LOCATION: LEG

## 2018-10-30 NOTE — PROGRESS NOTES
Never Smoker    Smokeless tobacco: Never Used    Alcohol use No       ALLERGIES    Allergies   Allergen Reactions    Penicillins Hives, Itching and Swelling       MEDICATIONS    Current Outpatient Prescriptions on File Prior to Encounter   Medication Sig Dispense Refill    Elastic Bandages & Supports (JOBST KNEE HIGH COMPRESSION SM) MISC Dispense Bilateral Jobst Below Knee 20-30mmHg compression stockings 3 each 0    Elastic Bandages & Supports (JOBST KNEE HIGH COMPRESSION SM) MISC Dispense Bilateral Jobst Below Knee 20-30mmHg compression stockings 3 each 0    acetaminophen (TYLENOL) 325 MG tablet Take 2 tablets by mouth every 4 hours as needed for Pain 120 tablet 3    Multiple Vitamins-Minerals (THERAPEUTIC MULTIVITAMIN-MINERALS) tablet Take 1 tablet by mouth daily      vitamin D (CHOLECALCIFEROL) 1000 UNIT TABS tablet Take 1,000 Units by mouth daily      zolpidem (AMBIEN) 10 MG tablet Take 10 mg by mouth nightly as needed for Sleep. Geoffrey Roberta aspirin 81 MG tablet Take 81 mg by mouth daily       No current facility-administered medications on file prior to encounter. REVIEW OF SYSTEMS    Pertinent items are noted in HPI.     Objective:      BP (!) 155/87   Pulse 93   Temp 97.8 °F (36.6 °C) (Tympanic)   Resp 18   Ht 5' 4\" (1.626 m)   Wt 183 lb (83 kg)   LMP  (LMP Unknown)   BMI 31.41 kg/m²     Wt Readings from Last 3 Encounters:   10/30/18 183 lb (83 kg)   09/25/18 183 lb (83 kg)   09/17/18 183 lb (83 kg)       PHYSICAL EXAM       General Appearance: alert and oriented to person, place and time, well-developed and well-nourished, in no acute distress  Skin: warm and dry and epithelialized wound to left lower anterior leg, minimal drainage noted, no surrounding erythema or odor  Wound 10/30/18 Other (Comment) Leg Left;Medial;Lower #2  (Active)   Wound Type Wound 10/30/2018  8:47 AM   Dressing Status Old drainage 10/30/2018  8:47 AM   Dressing Changed Changed/New 10/30/2018  8:47 AM   Wound Cleansed 8:47 AM   Dressing Status Old drainage 10/30/2018  8:47 AM   Dressing Changed Changed/New 10/30/2018  8:47 AM   Wound Cleansed Rinsed/Irrigated with saline 10/30/2018  8:47 AM   Wound Length (cm) 3.2 cm 10/30/2018  8:47 AM   Wound Width (cm) 3 cm 10/30/2018  8:47 AM   Wound Depth (cm)  0.2 10/30/2018  8:47 AM   Calculated Wound Size (cm^2) (l*w) 9.6 cm^2 10/30/2018  8:47 AM   Wound Assessment Drainage;Red;Yellow 10/30/2018  8:47 AM   Drainage Amount Moderate 10/30/2018  8:47 AM   Drainage Description Serosanguinous 10/30/2018  8:47 AM   Odor None 10/30/2018  8:47 AM   Margins Defined edges 10/30/2018  8:47 AM   Herlinda-wound Assessment Clean;Dry; Intact 10/30/2018  8:47 AM   Red%Wound Bed 80 10/30/2018  8:47 AM   Yellow%Wound Bed 20 10/30/2018  8:47 AM   Number of days: 0       Percent of Wound(s)/Ulcer(s) Debrided: 0%    Total Surface Area Debrided:  0 sq cm       Diabetic/Pressure/Non Pressure Ulcers only:  Ulcer: Diabetic ulcer, fat layer exposed, No debridement necessary      Estimated Blood Loss:  None    Hemostasis Achieved:  not needed    Procedural Pain:  0  / 10     Post Procedural Pain:  0 / 10     Response to treatment:  Well tolerated by patient. Treatment Note please see attached Discharge Instructions    Written patient dismissal instructions given to patient and signed by patient or POA.          Discharge Instructions                 [id number]      Visit  Discharge Instructions / Physician Orders  DATE:10/30/18    Home Care:    SUPPLIES ORDERED THRU:    Wound Location:  Left lower leg    Cleanse with:  Keep dry intact                   Dressing Orders: Fibracol then adaptic( cut holes in the adaptic) silvercel zinc unna boot    Frequency:Keep dry and intact                    Additional Orders: Increase protein to diet (meat, cheese, eggs, fish, peanut butter, nuts and beans)  Multivitamin daily    HYPERBARIC TREATMENT-                TREATMENT #    Your next appointment with Iwona Hilario is in  1 week. ROOM TYPE   [] CHAIR     [] BED   [] EITHER        TIME [] 45 MIN     [] 60 MIN    (Please note your next appointment above and if you are unable to keep, kindly give a 24 hour notice. Thank you.)    If you experience any of the following, please call the [facility] during business hours:  [phone]    * Increase in Pain  * Temperature over 101  * Increase in drainage from your wound  * Drainage with a foul odor  * Bleeding  * Increase in swelling  * Need for compression bandage changes due to slippage, breakthrough drainage. If you need medical attention outside of the business hours of the [facility] please contact your PCP or go to the nearest emergency room. AVS REVIEWED    YES [x]    Patient Signature:__________________________________________________Date:_______  Electronically signed by Alla Peterson RN on 10/30/2018 at 8:35 AM  Electronically signed by Bryant Gonzalez DPM on 10/30/2018 at 8:29 AM          Electronically signed by Jessica Sousa DPM on 10/30/2018 at 9:31 AM  I performed a history and physical examination of the patient and discussed management with the resident. I reviewed the residents note and agree with the documented findings and plan of care. Any areas of disagreement are noted on the chart. I was personally present for the key portions of any procedures. I have documented in the chart those procedures where I was not present during the key portions. I have reviewed the Podiatry Resident progress note. I agree with the chief complaint, past medical history, past surgical history, allergies, medications, social and family history as documented unless otherwise noted below. Documentation of the HPI, Physical Exam and Medical Decision Making performed by medical students or scribes is based on my personal performance of the HPI, PE and MDM.  I have personally evaluated this patient and have completed at

## 2018-11-06 ENCOUNTER — HOSPITAL ENCOUNTER (OUTPATIENT)
Dept: WOUND CARE | Age: 58
Discharge: HOME | End: 2018-11-06
Payer: COMMERCIAL

## 2018-11-06 VITALS
DIASTOLIC BLOOD PRESSURE: 102 MMHG | HEART RATE: 89 BPM | RESPIRATION RATE: 18 BRPM | TEMPERATURE: 98.8 F | WEIGHT: 183 LBS | SYSTOLIC BLOOD PRESSURE: 150 MMHG | BODY MASS INDEX: 31.41 KG/M2

## 2018-11-06 DIAGNOSIS — I87.2 VENOUS INSUFFICIENCY OF LEFT LOWER EXTREMITY: ICD-10-CM

## 2018-11-06 DIAGNOSIS — I89.0 LYMPHEDEMA OF BOTH LOWER EXTREMITIES: ICD-10-CM

## 2018-11-06 DIAGNOSIS — L97.922 NON-PRESSURE CHRONIC ULCER OF LOWER LEG, LEFT, WITH FAT LAYER EXPOSED (HCC): Primary | Chronic | ICD-10-CM

## 2018-11-06 PROCEDURE — 6370000000 HC RX 637 (ALT 250 FOR IP): Performed by: PODIATRIST

## 2018-11-06 PROCEDURE — 99213 OFFICE O/P EST LOW 20 MIN: CPT

## 2018-11-06 PROCEDURE — 99213 OFFICE O/P EST LOW 20 MIN: CPT | Performed by: PODIATRIST

## 2018-11-06 RX ORDER — LIDOCAINE HYDROCHLORIDE 40 MG/ML
SOLUTION TOPICAL ONCE
Status: COMPLETED | OUTPATIENT
Start: 2018-11-06 | End: 2018-11-06

## 2018-11-06 RX ORDER — DOXYCYCLINE HYCLATE 100 MG/1
100 CAPSULE ORAL 2 TIMES DAILY
Qty: 28 CAPSULE | Refills: 0 | Status: SHIPPED | OUTPATIENT
Start: 2018-11-06 | End: 2018-11-20

## 2018-11-06 RX ADMIN — LIDOCAINE HYDROCHLORIDE 5 ML: 40 SOLUTION TOPICAL at 08:49

## 2018-11-06 ASSESSMENT — PAIN SCALES - GENERAL: PAINLEVEL_OUTOF10: 5

## 2018-11-06 ASSESSMENT — PAIN DESCRIPTION - LOCATION: LOCATION: LEG

## 2018-11-06 ASSESSMENT — PAIN DESCRIPTION - ORIENTATION: ORIENTATION: LEFT

## 2018-11-06 ASSESSMENT — PAIN DESCRIPTION - DESCRIPTORS: DESCRIPTORS: THROBBING

## 2018-11-06 ASSESSMENT — PAIN DESCRIPTION - PAIN TYPE: TYPE: CHRONIC PAIN

## 2018-11-06 ASSESSMENT — PAIN DESCRIPTION - FREQUENCY: FREQUENCY: CONTINUOUS

## 2018-11-06 ASSESSMENT — PAIN DESCRIPTION - ONSET: ONSET: ON-GOING

## 2018-11-06 ASSESSMENT — PAIN DESCRIPTION - PROGRESSION: CLINICAL_PROGRESSION: NOT CHANGED

## 2018-11-06 NOTE — PROGRESS NOTES
patient and signed by patient or POA. Discharge Instructions         [address] [facility] and [address]                                 Visit  Discharge Instructions / Physician Orders  DATE: 11/6/18     Home Care:     SUPPLIES ORDERED THRU:     Wound Location:  Left lower leg     Cleanse with:  Keep dry intact                   Dressing Orders: Fibracol then Hydrafera blue foam zinc unna boot     Frequency:Keep dry and intact                    Additional Orders: Increase protein to diet (meat, cheese, eggs, fish, peanut butter, nuts and beans)  Multivitamin daily  Doxcycline 100mg 2x per day   Pt to be off work for two weeks, then will re-evaluate   Kelly from Dr. Chon Lugo office will call you with surgery time and date  HYPERBARIC TREATMENT-                TREATMENT #     Your next appointment with [address] [place] [address] is in  1 week     ROOM TYPE   [] CHAIR     [] BED   [] EITHER        TIME [] 45 MIN     [] 60 MIN     (Please note your next appointment above and if you are unable to keep, kindly give a 24 hour notice.  Thank you.)     If you experience any of the following, please call the [address] [place] [address] during business hours:  [phone]     * Increase in Pain  * Temperature over 101  * Increase in drainage from your wound  * Drainage with a foul odor  * Bleeding  * Increase in swelling  * Need for compression bandage changes due to slippage, breakthrough drainage.     If you need medical attention outside of the business hours of the [address] [place] [address] please contact your PCP or go to the nearest emergency room.     AVS REVIEWED    YES [x]     Patient Signature:__________________________________________________Date    Electronically signed by John Mcmahon DPM on 11/6/2018 at 8:28 AM  Electronically signed by Alan Beard RN on 11/6/2018 at 8:36 AM      Electronically signed by Mona De Paz DPM on 11/6/2018 at 9:25 AM    I performed a history and physical examination of the patient and discussed management with the resident. I reviewed the residents note and agree with the documented findings and plan of care. Any areas of disagreement are noted on the chart. I was personally present for the key portions of any procedures. I have documented in the chart those procedures where I was not present during the key portions. I have reviewed the Podiatry Resident progress note. I agree with the chief complaint, past medical history, past surgical history, allergies, medications, social and family history as documented unless otherwise noted below. Documentation of the HPI, Physical Exam and Medical Decision Making performed by medical students or scribes is based on my personal performance of the HPI, PE and MDM. I have personally evaluated this patient and have completed at least one if not all key elements of the E/M (history, physical exam, and MDM). Additional findings are as noted.      Jens Schuster DPM on 11/9/2018 at 7:45 AM  Board Certified, American Board of Podiatric Surgery  Fellow, [employer] [facility] and [employer] [facility]

## 2018-11-12 ENCOUNTER — ANESTHESIA (OUTPATIENT)
Dept: OPERATING ROOM | Age: 58
End: 2018-11-12
Payer: COMMERCIAL

## 2018-11-12 ENCOUNTER — HOSPITAL ENCOUNTER (OUTPATIENT)
Age: 58
Setting detail: OUTPATIENT SURGERY
Discharge: HOME | End: 2018-11-12
Attending: PODIATRIST | Admitting: PODIATRIST
Payer: COMMERCIAL

## 2018-11-12 ENCOUNTER — ANESTHESIA EVENT (OUTPATIENT)
Dept: OPERATING ROOM | Age: 58
End: 2018-11-12
Payer: COMMERCIAL

## 2018-11-12 VITALS — OXYGEN SATURATION: 100 % | SYSTOLIC BLOOD PRESSURE: 109 MMHG | DIASTOLIC BLOOD PRESSURE: 63 MMHG

## 2018-11-12 VITALS
WEIGHT: 184 LBS | OXYGEN SATURATION: 100 % | TEMPERATURE: 97.7 F | DIASTOLIC BLOOD PRESSURE: 73 MMHG | SYSTOLIC BLOOD PRESSURE: 131 MMHG | HEART RATE: 66 BPM | RESPIRATION RATE: 16 BRPM | HEIGHT: 63 IN | BODY MASS INDEX: 32.6 KG/M2

## 2018-11-12 DIAGNOSIS — G89.18 POST-OP PAIN: Primary | ICD-10-CM

## 2018-11-12 PROCEDURE — 7100000000 HC PACU RECOVERY - FIRST 15 MIN: Performed by: PODIATRIST

## 2018-11-12 PROCEDURE — 3700000000 HC ANESTHESIA ATTENDED CARE: Performed by: PODIATRIST

## 2018-11-12 PROCEDURE — 88305 TISSUE EXAM BY PATHOLOGIST: CPT

## 2018-11-12 PROCEDURE — 88312 SPECIAL STAINS GROUP 1: CPT

## 2018-11-12 PROCEDURE — 2580000003 HC RX 258: Performed by: ANESTHESIOLOGY

## 2018-11-12 PROCEDURE — 6360000002 HC RX W HCPCS: Performed by: NURSE ANESTHETIST, CERTIFIED REGISTERED

## 2018-11-12 PROCEDURE — 2500000003 HC RX 250 WO HCPCS: Performed by: PODIATRIST

## 2018-11-12 PROCEDURE — 2709999900 HC NON-CHARGEABLE SUPPLY: Performed by: PODIATRIST

## 2018-11-12 PROCEDURE — 3600000012 HC SURGERY LEVEL 2 ADDTL 15MIN: Performed by: PODIATRIST

## 2018-11-12 PROCEDURE — 3600000002 HC SURGERY LEVEL 2 BASE: Performed by: PODIATRIST

## 2018-11-12 PROCEDURE — 7100000030 HC ASPR PHASE II RECOVERY - FIRST 15 MIN: Performed by: PODIATRIST

## 2018-11-12 PROCEDURE — 11406 EXC TR-EXT B9+MARG >4.0 CM: CPT | Performed by: PODIATRIST

## 2018-11-12 PROCEDURE — 7100000031 HC ASPR PHASE II RECOVERY - ADDTL 15 MIN: Performed by: PODIATRIST

## 2018-11-12 PROCEDURE — 7100000001 HC PACU RECOVERY - ADDTL 15 MIN: Performed by: PODIATRIST

## 2018-11-12 PROCEDURE — 3700000001 HC ADD 15 MINUTES (ANESTHESIA): Performed by: PODIATRIST

## 2018-11-12 RX ORDER — BUPIVACAINE HYDROCHLORIDE 5 MG/ML
INJECTION, SOLUTION EPIDURAL; INTRACAUDAL PRN
Status: DISCONTINUED | OUTPATIENT
Start: 2018-11-12 | End: 2018-11-12 | Stop reason: HOSPADM

## 2018-11-12 RX ORDER — DIPHENHYDRAMINE HYDROCHLORIDE 50 MG/ML
12.5 INJECTION INTRAMUSCULAR; INTRAVENOUS
Status: DISCONTINUED | OUTPATIENT
Start: 2018-11-12 | End: 2018-11-12 | Stop reason: HOSPADM

## 2018-11-12 RX ORDER — PROPOFOL 10 MG/ML
INJECTION, EMULSION INTRAVENOUS CONTINUOUS PRN
Status: DISCONTINUED | OUTPATIENT
Start: 2018-11-12 | End: 2018-11-12 | Stop reason: SDUPTHER

## 2018-11-12 RX ORDER — FENTANYL CITRATE 50 UG/ML
25 INJECTION, SOLUTION INTRAMUSCULAR; INTRAVENOUS EVERY 5 MIN PRN
Status: DISCONTINUED | OUTPATIENT
Start: 2018-11-12 | End: 2018-11-12 | Stop reason: HOSPADM

## 2018-11-12 RX ORDER — MORPHINE SULFATE 2 MG/ML
1 INJECTION, SOLUTION INTRAMUSCULAR; INTRAVENOUS EVERY 5 MIN PRN
Status: DISCONTINUED | OUTPATIENT
Start: 2018-11-12 | End: 2018-11-12 | Stop reason: HOSPADM

## 2018-11-12 RX ORDER — OXYCODONE HYDROCHLORIDE AND ACETAMINOPHEN 5; 325 MG/1; MG/1
2 TABLET ORAL PRN
Status: DISCONTINUED | OUTPATIENT
Start: 2018-11-12 | End: 2018-11-12 | Stop reason: HOSPADM

## 2018-11-12 RX ORDER — MIDAZOLAM HYDROCHLORIDE 1 MG/ML
INJECTION INTRAMUSCULAR; INTRAVENOUS PRN
Status: DISCONTINUED | OUTPATIENT
Start: 2018-11-12 | End: 2018-11-12 | Stop reason: SDUPTHER

## 2018-11-12 RX ORDER — PROMETHAZINE HYDROCHLORIDE 25 MG/ML
6.25 INJECTION, SOLUTION INTRAMUSCULAR; INTRAVENOUS
Status: DISCONTINUED | OUTPATIENT
Start: 2018-11-12 | End: 2018-11-12 | Stop reason: HOSPADM

## 2018-11-12 RX ORDER — HYDROCODONE BITARTRATE AND ACETAMINOPHEN 5; 325 MG/1; MG/1
1 TABLET ORAL EVERY 6 HOURS PRN
Qty: 28 TABLET | Refills: 0 | Status: SHIPPED | OUTPATIENT
Start: 2018-11-12 | End: 2018-11-15

## 2018-11-12 RX ORDER — MEPERIDINE HYDROCHLORIDE 50 MG/ML
12.5 INJECTION INTRAMUSCULAR; INTRAVENOUS; SUBCUTANEOUS EVERY 5 MIN PRN
Status: DISCONTINUED | OUTPATIENT
Start: 2018-11-12 | End: 2018-11-12 | Stop reason: HOSPADM

## 2018-11-12 RX ORDER — SODIUM CHLORIDE, SODIUM LACTATE, POTASSIUM CHLORIDE, CALCIUM CHLORIDE 600; 310; 30; 20 MG/100ML; MG/100ML; MG/100ML; MG/100ML
INJECTION, SOLUTION INTRAVENOUS CONTINUOUS
Status: DISCONTINUED | OUTPATIENT
Start: 2018-11-12 | End: 2018-11-12 | Stop reason: HOSPADM

## 2018-11-12 RX ORDER — FENTANYL CITRATE 50 UG/ML
INJECTION, SOLUTION INTRAMUSCULAR; INTRAVENOUS PRN
Status: DISCONTINUED | OUTPATIENT
Start: 2018-11-12 | End: 2018-11-12 | Stop reason: SDUPTHER

## 2018-11-12 RX ORDER — OXYCODONE HYDROCHLORIDE AND ACETAMINOPHEN 5; 325 MG/1; MG/1
1 TABLET ORAL PRN
Status: DISCONTINUED | OUTPATIENT
Start: 2018-11-12 | End: 2018-11-12 | Stop reason: HOSPADM

## 2018-11-12 RX ORDER — PROPOFOL 10 MG/ML
INJECTION, EMULSION INTRAVENOUS PRN
Status: DISCONTINUED | OUTPATIENT
Start: 2018-11-12 | End: 2018-11-12 | Stop reason: SDUPTHER

## 2018-11-12 RX ORDER — LIDOCAINE HYDROCHLORIDE 10 MG/ML
INJECTION, SOLUTION INFILTRATION; PERINEURAL PRN
Status: DISCONTINUED | OUTPATIENT
Start: 2018-11-12 | End: 2018-11-12 | Stop reason: HOSPADM

## 2018-11-12 RX ADMIN — FENTANYL CITRATE 25 MCG: 50 INJECTION, SOLUTION INTRAMUSCULAR; INTRAVENOUS at 09:18

## 2018-11-12 RX ADMIN — MIDAZOLAM 1 MG: 1 INJECTION INTRAMUSCULAR; INTRAVENOUS at 08:58

## 2018-11-12 RX ADMIN — SODIUM CHLORIDE, POTASSIUM CHLORIDE, SODIUM LACTATE AND CALCIUM CHLORIDE: 600; 310; 30; 20 INJECTION, SOLUTION INTRAVENOUS at 08:02

## 2018-11-12 RX ADMIN — PROPOFOL 30 MG: 10 INJECTION, EMULSION INTRAVENOUS at 09:19

## 2018-11-12 RX ADMIN — MIDAZOLAM 1 MG: 1 INJECTION INTRAMUSCULAR; INTRAVENOUS at 09:01

## 2018-11-12 RX ADMIN — FENTANYL CITRATE 25 MCG: 50 INJECTION, SOLUTION INTRAMUSCULAR; INTRAVENOUS at 09:20

## 2018-11-12 RX ADMIN — PROPOFOL 20 MG: 10 INJECTION, EMULSION INTRAVENOUS at 09:09

## 2018-11-12 RX ADMIN — FENTANYL CITRATE 50 MCG: 50 INJECTION, SOLUTION INTRAMUSCULAR; INTRAVENOUS at 09:06

## 2018-11-12 RX ADMIN — PROPOFOL 100 MCG/KG/MIN: 10 INJECTION, EMULSION INTRAVENOUS at 09:06

## 2018-11-12 RX ADMIN — PROPOFOL 30 MG: 10 INJECTION, EMULSION INTRAVENOUS at 09:06

## 2018-11-12 RX ADMIN — PROPOFOL 20 MG: 10 INJECTION, EMULSION INTRAVENOUS at 09:11

## 2018-11-12 ASSESSMENT — PULMONARY FUNCTION TESTS
PIF_VALUE: 1
PIF_VALUE: 0
PIF_VALUE: 1

## 2018-11-12 ASSESSMENT — PAIN SCALES - GENERAL
PAINLEVEL_OUTOF10: 0

## 2018-11-12 ASSESSMENT — PAIN - FUNCTIONAL ASSESSMENT: PAIN_FUNCTIONAL_ASSESSMENT: 0-10

## 2018-11-12 NOTE — H&P
doxycycline hyclate (VIBRAMYCIN) 100 MG capsule Take 1 capsule by mouth 2 times daily for 14 days 28 capsule 0    Elastic Bandages & Supports (JOBST KNEE HIGH COMPRESSION SM) MISC Dispense Bilateral Jobst Below Knee 20-30mmHg compression stockings 3 each 0    Elastic Bandages & Supports (JOBST KNEE HIGH COMPRESSION SM) MISC Dispense Bilateral Jobst Below Knee 20-30mmHg compression stockings 3 each 0    acetaminophen (TYLENOL) 325 MG tablet Take 2 tablets by mouth every 4 hours as needed for Pain 120 tablet 3    Multiple Vitamins-Minerals (THERAPEUTIC MULTIVITAMIN-MINERALS) tablet Take 1 tablet by mouth daily      vitamin D (CHOLECALCIFEROL) 1000 UNIT TABS tablet Take 1,000 Units by mouth daily      zolpidem (AMBIEN) 10 MG tablet Take 10 mg by mouth nightly as needed for Sleep. Nida Cha aspirin 81 MG tablet Take 81 mg by mouth daily         Negative except for what is mentioned in the HPI. GENERAL PHYSICAL EXAM     Vitals: /75   Pulse 88   Temp 98.1 °F (36.7 °C) (Oral)   Resp 16   Ht 5' 3\" (1.6 m)   Wt 184 lb (83.5 kg)   LMP  (LMP Unknown)   SpO2 100%   BMI 32.59 kg/m²  Body mass index is 32.59 kg/m². GENERAL APPEARANCE:   Casper Dodd is 62 y.o.,  female, moderately obese, nourished, conscious, alert. Does not appear to be distress or pain at this time. SKIN:  Warm, dry, no cyanosis or jaundice. HEAD:  Normocephalic, atraumatic, no swelling or tenderness. EYES:  Pupils equal, reactive to light. EARS:  No discharge, no marked hearing loss. NOSE:  No rhinorrhea, epistaxis or septal deformity. THROAT:  Not congested. No ulceration bleeding or discharge. NECK:  No stiffness, trachea central.  No palpable masses or L.N.                 CHEST:  Symmetrical and equal on expansion. HEART:  RRR S1 > S2. No audible murmurs or gallops. LUNGS:  Equal on expansion, normal breath sounds. No adventitious sounds. ABDOMEN:  Obese. Soft on palpation. No localized tenderness. No guarding or rigidity. No palpable hepatosplenomegaly. LYMPHATICS:  No palpable cervical lymphadenopathy. LOCOMOTOR, BACK AND SPINE:  No tenderness or deformities. EXTREMITIES:  Symmetrical, mild chronic pretibial edema. Chronic ulcer in the medial aspect of the distal left leg, Ariess sign negative. NEUROLOGIC:  The patient is conscious, alert, oriented, No apparent focal sensory or motor deficits.                       PROVISIONAL DIAGNOSES / SURGERY:      Lt Leg Ulcer, Excisional Bx,    Patient Active Problem List    Diagnosis Date Noted    Lymphedema of both lower extremities 10/30/2018    Acute kidney injury ([facility] 09/07/2018    Right hip pain 09/07/2018    Venous insufficiency of left lower extremity 08/31/2018    Acute post-operative pain 08/20/2018    Non-pressure chronic ulcer of lower leg, left, with fat layer exposed ([address] 08/17/2018    Pyoderma gangrenosum 08/17/2018    Leg edema, left 08/17/2018           KTAHRYN CARBAJAL PA-C on 11/12/2018 at 8:02 AM

## 2018-11-16 ENCOUNTER — HOSPITAL ENCOUNTER (OUTPATIENT)
Dept: WOUND CARE | Age: 58
Discharge: HOME | End: 2018-11-16
Payer: COMMERCIAL

## 2018-11-16 VITALS
WEIGHT: 194 LBS | SYSTOLIC BLOOD PRESSURE: 142 MMHG | HEART RATE: 93 BPM | DIASTOLIC BLOOD PRESSURE: 91 MMHG | BODY MASS INDEX: 34.38 KG/M2 | HEIGHT: 63 IN | RESPIRATION RATE: 18 BRPM | TEMPERATURE: 98.2 F

## 2018-11-16 DIAGNOSIS — L88 PYODERMA GANGRENOSUM: ICD-10-CM

## 2018-11-16 DIAGNOSIS — D23.72 BENIGN NEOPLASM OF SKIN OF LEFT LOWER EXTREMITY: Primary | ICD-10-CM

## 2018-11-16 DIAGNOSIS — M25.551 RIGHT HIP PAIN: ICD-10-CM

## 2018-11-16 DIAGNOSIS — I87.2 VENOUS INSUFFICIENCY OF LEFT LOWER EXTREMITY: ICD-10-CM

## 2018-11-16 DIAGNOSIS — L97.922 NON-PRESSURE CHRONIC ULCER OF LOWER LEG, LEFT, WITH FAT LAYER EXPOSED (HCC): Chronic | ICD-10-CM

## 2018-11-16 DIAGNOSIS — N17.9 ACUTE KIDNEY INJURY (HCC): ICD-10-CM

## 2018-11-16 DIAGNOSIS — G89.18 ACUTE POST-OPERATIVE PAIN: ICD-10-CM

## 2018-11-16 DIAGNOSIS — R60.0 LEG EDEMA, LEFT: ICD-10-CM

## 2018-11-16 DIAGNOSIS — I89.0 LYMPHEDEMA OF BOTH LOWER EXTREMITIES: ICD-10-CM

## 2018-11-16 PROCEDURE — 6370000000 HC RX 637 (ALT 250 FOR IP): Performed by: PODIATRIST

## 2018-11-16 PROCEDURE — 99213 OFFICE O/P EST LOW 20 MIN: CPT

## 2018-11-16 PROCEDURE — 99024 POSTOP FOLLOW-UP VISIT: CPT | Performed by: PODIATRIST

## 2018-11-16 RX ORDER — LIDOCAINE HYDROCHLORIDE 40 MG/ML
SOLUTION TOPICAL ONCE
Status: COMPLETED | OUTPATIENT
Start: 2018-11-16 | End: 2018-11-16

## 2018-11-16 RX ORDER — OXYCODONE AND ACETAMINOPHEN 10; 325 MG/1; MG/1
1 TABLET ORAL EVERY 6 HOURS PRN
Qty: 25 TABLET | Refills: 0 | Status: SHIPPED | OUTPATIENT
Start: 2018-11-16 | End: 2018-11-23

## 2018-11-16 RX ADMIN — LIDOCAINE HYDROCHLORIDE 10 ML: 40 SOLUTION TOPICAL at 09:07

## 2018-11-16 ASSESSMENT — PAIN DESCRIPTION - FREQUENCY: FREQUENCY: CONTINUOUS

## 2018-11-16 ASSESSMENT — PAIN SCALES - GENERAL: PAINLEVEL_OUTOF10: 5

## 2018-11-16 ASSESSMENT — PAIN DESCRIPTION - DESCRIPTORS: DESCRIPTORS: PINS AND NEEDLES

## 2018-11-16 ASSESSMENT — PAIN DESCRIPTION - LOCATION: LOCATION: LEG

## 2018-11-16 ASSESSMENT — PAIN DESCRIPTION - PAIN TYPE: TYPE: CHRONIC PAIN

## 2018-11-16 ASSESSMENT — PAIN DESCRIPTION - ORIENTATION: ORIENTATION: LEFT

## 2018-11-16 NOTE — PROGRESS NOTES
Called pathology to request that lab specimen collected on 11-12-18 be sent to dermatopathologist for examination per Dr. Yosvany Coleman. Pathology confirmed that they will send it.

## 2018-11-16 NOTE — PROGRESS NOTES
[facility]   Progress Note and Procedure Note      Anya Balderas  MEDICAL RECORD NUMBER:  [id number]  AGE: 62 y.o. GENDER: female  : 1960  EPISODE DATE:  2018    Subjective:     Chief Complaint   Patient presents with    Wound Check     left lower leg         HISTORY of PRESENT ILLNESS HPI     Anya Balderas is a 62 y.o. female who presents today for wound/ulcer evaluation. S/P excisional biopsy, waiting for final results. Patient states that she is still having pain in the wound. Patient states that she left the unna boot on. History of Wound Context:   62 y. o. female who presents today for evaluation and treatment for a venous and non-healing/non-surgical wound which is located on the left leg. Patient underwent a biopsy and debridement of ulcer on (18). Patient states that she is still having pain in her leg. Patient states that she is unsure of how the wound started, stating she may have bumped it at work. Biopsy showed likely venous stasis for previously healed wound. An UNNA boot was being used to heal previous wound. Notes new wound new previously healed wound. Today the patient notes the pain is severe and keeps her up at night. Pt is concerned the wound will prevent her from having hip surgery.      Wound/Ulcer Pain Timing/Severity: constant  Quality of pain: sharp  Severity:  10 / 10   Modifying Factors: None  Associated Signs/Symptoms: edema    Ulcer Identification:  Ulcer Type: venous  Contributing Factors: edema    Wound: Venous insufficiency ulceration        PAST MEDICAL HISTORY        Diagnosis Date    Arthritis     right knee       PAST SURGICAL HISTORY    Past Surgical History:   Procedure Laterality Date    COLONOSCOPY      LEG SURGERY      left lower leg    MD BIOPSY OF SKIN LESION Left 2018    LEG LESION BIOPSY EXCISION performed by Jens Schuster DPM at [address] INCIS/DRAIN THIGH/KNEE ABSCESS,DEEP Left 2018    LEG ULCER TISSUE BIOPSY W/CULTURE performed by Anisha Pinto DPM at [facility] Left        FAMILY HISTORY    Family History   Problem Relation Age of Onset    Other Mother     Kidney Disease Father        SOCIAL HISTORY    Social History   Substance Use Topics    Smoking status: Never Smoker    Smokeless tobacco: Never Used    Alcohol use No       ALLERGIES    Allergies   Allergen Reactions    Penicillins Hives, Itching and Swelling       MEDICATIONS    Current Outpatient Prescriptions on File Prior to Encounter   Medication Sig Dispense Refill    doxycycline hyclate (VIBRAMYCIN) 100 MG capsule Take 1 capsule by mouth 2 times daily for 14 days 28 capsule 0    Elastic Bandages & Supports (JOBST KNEE HIGH COMPRESSION SM) MISC Dispense Bilateral Jobst Below Knee 20-30mmHg compression stockings 3 each 0    Elastic Bandages & Supports (JOBST KNEE HIGH COMPRESSION SM) MISC Dispense Bilateral Jobst Below Knee 20-30mmHg compression stockings 3 each 0    Multiple Vitamins-Minerals (THERAPEUTIC MULTIVITAMIN-MINERALS) tablet Take 1 tablet by mouth daily      vitamin D (CHOLECALCIFEROL) 1000 UNIT TABS tablet Take 1,000 Units by mouth daily      zolpidem (AMBIEN) 10 MG tablet Take 10 mg by mouth nightly as needed for Sleep. Sinai Butts aspirin 81 MG tablet Take 81 mg by mouth daily       No current facility-administered medications on file prior to encounter. REVIEW OF SYSTEMS    Pertinent items are noted in HPI.     Objective:      BP (!) 142/91   Pulse 93   Temp 98.2 °F (36.8 °C) (Tympanic)   Resp 18   Ht 5' 3\" (1.6 m)   Wt 194 lb (88 kg)   LMP  (LMP Unknown)   BMI 34.37 kg/m²     Wt Readings from Last 3 Encounters:   11/16/18 194 lb (88 kg)   11/12/18 184 lb (83.5 kg)   11/06/18 183 lb (83 kg)       PHYSICAL EXAM       General Appearance: alert and oriented to person, place and time, well-developed and well-nourished, in no acute distress  Skin: warm and dry and

## 2018-11-20 ENCOUNTER — HOSPITAL ENCOUNTER (OUTPATIENT)
Dept: WOUND CARE | Age: 58
Discharge: HOME | End: 2018-11-20
Payer: COMMERCIAL

## 2018-11-20 VITALS
HEART RATE: 117 BPM | TEMPERATURE: 99 F | SYSTOLIC BLOOD PRESSURE: 150 MMHG | BODY MASS INDEX: 34.38 KG/M2 | RESPIRATION RATE: 18 BRPM | HEIGHT: 63 IN | DIASTOLIC BLOOD PRESSURE: 96 MMHG | WEIGHT: 194 LBS

## 2018-11-20 DIAGNOSIS — D23.72 BENIGN NEOPLASM OF SKIN OF LEFT LOWER EXTREMITY: ICD-10-CM

## 2018-11-20 DIAGNOSIS — L97.922 NON-PRESSURE CHRONIC ULCER OF LOWER LEG, LEFT, WITH FAT LAYER EXPOSED (HCC): Primary | Chronic | ICD-10-CM

## 2018-11-20 DIAGNOSIS — I87.2 VENOUS INSUFFICIENCY OF LEFT LOWER EXTREMITY: ICD-10-CM

## 2018-11-20 DIAGNOSIS — I89.0 LYMPHEDEMA OF BOTH LOWER EXTREMITIES: ICD-10-CM

## 2018-11-20 LAB — SURGICAL PATHOLOGY REPORT: NORMAL

## 2018-11-20 PROCEDURE — 99213 OFFICE O/P EST LOW 20 MIN: CPT | Performed by: PODIATRIST

## 2018-11-20 PROCEDURE — 99213 OFFICE O/P EST LOW 20 MIN: CPT

## 2018-11-20 PROCEDURE — 6370000000 HC RX 637 (ALT 250 FOR IP): Performed by: PODIATRIST

## 2018-11-20 RX ORDER — LIDOCAINE HYDROCHLORIDE 40 MG/ML
SOLUTION TOPICAL ONCE
Status: COMPLETED | OUTPATIENT
Start: 2018-11-20 | End: 2018-11-20

## 2018-11-20 RX ADMIN — LIDOCAINE HYDROCHLORIDE 10 ML: 40 SOLUTION TOPICAL at 08:48

## 2018-11-20 ASSESSMENT — PAIN SCALES - GENERAL: PAINLEVEL_OUTOF10: 0

## 2018-11-20 NOTE — PROGRESS NOTES
edema, left R60.0    Acute post-operative pain G89.18    Venous insufficiency of left lower extremity I87.2    Acute kidney injury (HCC) N17.9    Right hip pain M25.551    Lymphedema of both lower extremities I89.0    Benign neoplasm of skin of left lower extremity D23.72        Procedure Note  Indications:  Based on my examination of this patient's wound(s)/ulcer(s) today, debridement is not required to promote healing and evaluate the wound base. Performed by: Kenyetta Salinas DPM    Consent obtained:  N/a    Time out taken:  n/a    Pain Control: Anesthetic  Anesthetic: 4% Lidocaine Liquid Topical       Debridement:Other (comment) None needed today    Pre Debridement Measurements:  Are located in the [facility]  Documentation Flow Sheet    Wound/Ulcer #: 1    Post Debridement Measurements:  Wound/Ulcer Descriptions are Pre Debridement except measurements:    Wound 10/30/18 Other (Comment) Leg Left;Medial;Lower #2  (Active)   Wound Image   11/16/2018  8:16 AM   Wound Type Wound 11/20/2018  8:32 AM   Wound Traumatic 11/20/2018  8:32 AM   Dressing Status Old drainage 11/20/2018  8:32 AM   Dressing Changed Changed/New 11/20/2018  8:32 AM   Dressing/Treatment Foam 11/12/2018  7:45 AM   Wound Cleansed Rinsed/Irrigated with saline 11/16/2018  8:16 AM   Wound Length (cm) 5 cm 11/20/2018  8:32 AM   Wound Width (cm) 6 cm 11/20/2018  8:32 AM   Wound Depth (cm)  0.4 11/20/2018  8:32 AM   Calculated Wound Size (cm^2) (l*w) 30 cm^2 11/20/2018  8:32 AM   Change in Wound Size % (l*w) -212.5 11/20/2018  8:32 AM   Wound Assessment Drainage;Red;Yellow;Fibrin 11/20/2018  8:32 AM   Drainage Amount Moderate 11/20/2018  8:32 AM   Drainage Description Serosanguinous 11/20/2018  8:32 AM   Odor None 11/20/2018  8:32 AM   Margins Defined edges 11/20/2018  8:32 AM   Herlinda-wound Assessment Clean;Dry; Intact 11/20/2018  8:32 AM   Red%Wound Bed 90 11/20/2018  8:32 AM   Yellow%Wound Bed 10 11/20/2018  8:32 AM   Number of days: 21 Incision 11/12/18 Foot Left (Active)   Wound Assessment Clean;Dry; Intact 11/12/2018 10:45 AM   Herlinda-wound Assessment Clean;Dry; Intact 11/12/2018 10:45 AM   Drainage Amount None 11/12/2018 10:45 AM   Dressing/Treatment Ace Wrap;Dry dressing 11/12/2018 10:45 AM   Dressing Status Clean;Dry; Intact 11/12/2018 10:45 AM   Number of days: 7       Percent of Wound(s)/Ulcer(s) Debrided: 0%    Total Surface Area Debrided:  0 sq cm       Diabetic/Pressure/Non Pressure Ulcers only:  Ulcer: Diabetic ulcer, fat layer exposed      Estimated Blood Loss:  None    Hemostasis Achieved:  not needed    Procedural Pain:  0  / 10     Post Procedural Pain:  0 / 10     Response to treatment:  Well tolerated by patient. Plan:     Treatment Note please see attached Discharge Instructions    Written patient dismissal instructions given to patient and signed by patient or POA. Discharge Instructions         [facility] [place] [facility]REAT[facility]  CENTER                                 Visit [facility] Instructions / Physician Orders  DATE: 11/20/18     Home Care:     SUPPLIES ORDERED THRU:     Wound Location:  Left lower leg     Cleanse with:  Keep dry intact     Dressing Orders: Fibracol then Hydrafera blue foam zinc unna boot     Frequency:Keep dry and intact     Additional Orders: Increase protein to diet (meat, cheese, eggs, fish, peanut butter, nuts and beans)  Multivitamin daily  Doxcycline 100mg 2x per day   Pt to remain off work thru Dec 31,2018 due to medical condition     HYPERBARIC TREATMENT-                TREATMENT #     Your next appointment with [address] [place] [address] is in  1 week     ROOM TYPE   [] CHAIR     [] BED   [] EITHER        TIME [] 45 MIN     [] 60 MIN     (Please note your next appointment above and if you are unable to keep, kindly give a 24 hour notice.  Thank you.)     If you experience any of the following, please call the [address] [place] [address] during business hours:  [phone]     * Increase in

## 2018-11-21 LAB — SURGICAL PATHOLOGY REPORT: NORMAL

## 2018-11-26 ENCOUNTER — HOSPITAL ENCOUNTER (OUTPATIENT)
Age: 58
Discharge: HOME | End: 2018-11-26
Payer: COMMERCIAL

## 2018-11-26 LAB
ANION GAP SERPL CALCULATED.3IONS-SCNC: 12 MMOL/L (ref 9–17)
BUN BLDV-MCNC: 10 MG/DL (ref 6–20)
BUN/CREAT BLD: ABNORMAL (ref 9–20)
CALCIUM SERPL-MCNC: 9.6 MG/DL (ref 8.6–10.4)
CHLORIDE BLD-SCNC: 102 MMOL/L (ref 98–107)
CO2: 27 MMOL/L (ref 20–31)
CREAT SERPL-MCNC: 0.63 MG/DL (ref 0.5–0.9)
GFR AFRICAN AMERICAN: >60 ML/MIN
GFR NON-AFRICAN AMERICAN: >60 ML/MIN
GFR SERPL CREATININE-BSD FRML MDRD: ABNORMAL ML/MIN/{1.73_M2}
GFR SERPL CREATININE-BSD FRML MDRD: ABNORMAL ML/MIN/{1.73_M2}
GLUCOSE BLD-MCNC: 133 MG/DL (ref 70–99)
HCT VFR BLD CALC: 35.1 % (ref 36–46)
HEMOGLOBIN: 11.5 G/DL (ref 12–16)
MCH RBC QN AUTO: 29 PG (ref 26–34)
MCHC RBC AUTO-ENTMCNC: 32.8 G/DL (ref 31–37)
MCV RBC AUTO: 88.3 FL (ref 80–100)
NRBC AUTOMATED: ABNORMAL PER 100 WBC
PDW BLD-RTO: 14.9 % (ref 11.5–14.9)
PLATELET # BLD: 388 K/UL (ref 150–450)
PMV BLD AUTO: 7.6 FL (ref 6–12)
POTASSIUM SERPL-SCNC: 4.2 MMOL/L (ref 3.7–5.3)
RBC # BLD: 3.98 M/UL (ref 4–5.2)
SODIUM BLD-SCNC: 141 MMOL/L (ref 135–144)
WBC # BLD: 4.7 K/UL (ref 3.5–11)

## 2018-11-26 PROCEDURE — 80048 BASIC METABOLIC PNL TOTAL CA: CPT

## 2018-11-26 PROCEDURE — 85027 COMPLETE CBC AUTOMATED: CPT

## 2018-11-26 PROCEDURE — 36415 COLL VENOUS BLD VENIPUNCTURE: CPT

## 2018-11-27 ENCOUNTER — HOSPITAL ENCOUNTER (OUTPATIENT)
Dept: WOUND CARE | Age: 58
Discharge: HOME | End: 2018-11-27
Payer: COMMERCIAL

## 2018-11-27 VITALS
WEIGHT: 194 LBS | DIASTOLIC BLOOD PRESSURE: 94 MMHG | TEMPERATURE: 98.5 F | RESPIRATION RATE: 18 BRPM | SYSTOLIC BLOOD PRESSURE: 156 MMHG | HEIGHT: 63 IN | BODY MASS INDEX: 34.38 KG/M2 | HEART RATE: 107 BPM

## 2018-11-27 DIAGNOSIS — I89.0 LYMPHEDEMA OF BOTH LOWER EXTREMITIES: ICD-10-CM

## 2018-11-27 DIAGNOSIS — L88 PYODERMA GANGRENOSUM: Primary | ICD-10-CM

## 2018-11-27 DIAGNOSIS — D23.72 BENIGN NEOPLASM OF SKIN OF LEFT LOWER EXTREMITY: ICD-10-CM

## 2018-11-27 DIAGNOSIS — L97.922 NON-PRESSURE CHRONIC ULCER OF LOWER LEG, LEFT, WITH FAT LAYER EXPOSED (HCC): Chronic | ICD-10-CM

## 2018-11-27 PROCEDURE — 99213 OFFICE O/P EST LOW 20 MIN: CPT | Performed by: PODIATRIST

## 2018-11-27 PROCEDURE — 99213 OFFICE O/P EST LOW 20 MIN: CPT

## 2018-11-27 PROCEDURE — 29580 STRAPPING UNNA BOOT: CPT

## 2018-11-27 RX ORDER — LIDOCAINE HYDROCHLORIDE 40 MG/ML
SOLUTION TOPICAL ONCE
Status: DISCONTINUED | OUTPATIENT
Start: 2018-11-27 | End: 2018-11-28 | Stop reason: HOSPADM

## 2018-11-27 ASSESSMENT — PAIN SCALES - GENERAL: PAINLEVEL_OUTOF10: 0

## 2018-11-27 NOTE — PROGRESS NOTES
Topics    Smoking status: Never Smoker    Smokeless tobacco: Never Used    Alcohol use No       ALLERGIES    Allergies   Allergen Reactions    Penicillins Hives, Itching and Swelling       MEDICATIONS    Current Outpatient Prescriptions on File Prior to Encounter   Medication Sig Dispense Refill    Elastic Bandages & Supports (JOBST KNEE HIGH COMPRESSION SM) MISC Dispense Bilateral Jobst Below Knee 20-30mmHg compression stockings 3 each 0    Elastic Bandages & Supports (JOBST KNEE HIGH COMPRESSION SM) MISC Dispense Bilateral Jobst Below Knee 20-30mmHg compression stockings 3 each 0    Multiple Vitamins-Minerals (THERAPEUTIC MULTIVITAMIN-MINERALS) tablet Take 1 tablet by mouth daily      vitamin D (CHOLECALCIFEROL) 1000 UNIT TABS tablet Take 1,000 Units by mouth daily      zolpidem (AMBIEN) 10 MG tablet Take 10 mg by mouth nightly as needed for Sleep. Kurtis November aspirin 81 MG tablet Take 81 mg by mouth daily       No current facility-administered medications on file prior to encounter. REVIEW OF SYSTEMS    Pertinent items are noted in HPI.     Objective:      BP (!) 156/94   Pulse 107   Temp 98.5 °F (36.9 °C) (Tympanic)   Resp 18   Ht 5' 3\" (1.6 m)   Wt 194 lb (88 kg)   LMP  (LMP Unknown)   BMI 34.37 kg/m²     Wt Readings from Last 3 Encounters:   11/27/18 194 lb (88 kg)   11/20/18 194 lb (88 kg)   11/16/18 194 lb (88 kg)       PHYSICAL EXAM    General Appearance: alert and oriented to person, place and time, well-developed and well-nourished, in no acute distress  Skin: warm and granular wound to left lower anterior leg, minimal drainage noted, no surrounding erythema or odor    Assessment:      Patient Active Problem List   Diagnosis Code    Non-pressure chronic ulcer of lower leg, left, with fat layer exposed ([facility] [address] J04.377    Pyoderma gangrenosum L88    Leg edema, left R60.0    Acute post-operative pain G89.18    Venous insufficiency of left lower extremity I87.2    Acute kidney injury

## 2018-12-04 ENCOUNTER — HOSPITAL ENCOUNTER (OUTPATIENT)
Dept: WOUND CARE | Age: 58
Discharge: HOME | End: 2018-12-04
Payer: COMMERCIAL

## 2018-12-04 VITALS
DIASTOLIC BLOOD PRESSURE: 98 MMHG | WEIGHT: 194 LBS | HEIGHT: 63 IN | TEMPERATURE: 97.8 F | BODY MASS INDEX: 34.38 KG/M2 | HEART RATE: 113 BPM | SYSTOLIC BLOOD PRESSURE: 151 MMHG | RESPIRATION RATE: 22 BRPM

## 2018-12-04 DIAGNOSIS — D23.72 BENIGN NEOPLASM OF SKIN OF LEFT LOWER EXTREMITY: Primary | ICD-10-CM

## 2018-12-04 DIAGNOSIS — L97.922 NON-PRESSURE CHRONIC ULCER OF LOWER LEG, LEFT, WITH FAT LAYER EXPOSED (HCC): Chronic | ICD-10-CM

## 2018-12-04 DIAGNOSIS — L88 PYODERMA GANGRENOSUM: ICD-10-CM

## 2018-12-04 DIAGNOSIS — I89.0 LYMPHEDEMA OF BOTH LOWER EXTREMITIES: ICD-10-CM

## 2018-12-04 PROCEDURE — 6370000000 HC RX 637 (ALT 250 FOR IP): Performed by: PODIATRIST

## 2018-12-04 PROCEDURE — 29580 STRAPPING UNNA BOOT: CPT | Performed by: PODIATRIST

## 2018-12-04 PROCEDURE — 99213 OFFICE O/P EST LOW 20 MIN: CPT | Performed by: PODIATRIST

## 2018-12-04 PROCEDURE — 99213 OFFICE O/P EST LOW 20 MIN: CPT

## 2018-12-04 RX ORDER — LIDOCAINE HYDROCHLORIDE 40 MG/ML
SOLUTION TOPICAL ONCE
Status: COMPLETED | OUTPATIENT
Start: 2018-12-04 | End: 2018-12-04

## 2018-12-04 RX ORDER — ASCORBIC ACID 500 MG
1000 TABLET ORAL DAILY
COMMUNITY

## 2018-12-04 RX ADMIN — LIDOCAINE HYDROCHLORIDE 5 ML: 40 SOLUTION TOPICAL at 09:01

## 2018-12-04 ASSESSMENT — PAIN SCALES - GENERAL: PAINLEVEL_OUTOF10: 0

## 2018-12-04 NOTE — PROGRESS NOTES
Problem Relation Age of Onset    Other Mother     Kidney Disease Father        SOCIAL HISTORY    Social History   Substance Use Topics    Smoking status: Never Smoker    Smokeless tobacco: Never Used    Alcohol use No       ALLERGIES    Allergies   Allergen Reactions    Penicillins Hives, Itching and Swelling       MEDICATIONS    Current Outpatient Prescriptions on File Prior to Encounter   Medication Sig Dispense Refill    Elastic Bandages & Supports (JOBST KNEE HIGH COMPRESSION SM) MISC Dispense Bilateral Jobst Below Knee 20-30mmHg compression stockings 3 each 0    Elastic Bandages & Supports (JOBST KNEE HIGH COMPRESSION SM) MISC Dispense Bilateral Jobst Below Knee 20-30mmHg compression stockings 3 each 0    Multiple Vitamins-Minerals (THERAPEUTIC MULTIVITAMIN-MINERALS) tablet Take 1 tablet by mouth daily      vitamin D (CHOLECALCIFEROL) 1000 UNIT TABS tablet Take 1,000 Units by mouth daily      zolpidem (AMBIEN) 10 MG tablet Take 10 mg by mouth nightly as needed for Sleep. [place] Carey aspirin 81 MG tablet Take 81 mg by mouth daily       No current facility-administered medications on file prior to encounter. REVIEW OF SYSTEMS    Pertinent items are noted in HPI. Objective:      BP (!) 151/98   Pulse 113   Temp 97.8 °F (36.6 °C) (Tympanic)   Resp 22   Ht 5' 3\" (1.6 m)   Wt 194 lb (88 kg)   LMP  (LMP Unknown)   BMI 34.37 kg/m²     Wt Readings from Last 3 Encounters:   12/04/18 194 lb (88 kg)   11/27/18 194 lb (88 kg)   11/20/18 194 lb (88 kg)       PHYSICAL EXAM    General Appearance: alert and oriented to person, place and time, well-developed and well-nourished, in no acute distress  Skin: warm and granular wound to left lower anterior leg, minimal drainage noted, no surrounding erythema or odor    Incision 11/12/18 Foot Left (Active)   Wound Assessment Clean;Dry; Intact 11/12/2018 10:45 AM   Herlinda-wound Assessment Clean;Dry; Intact 11/12/2018 10:45 AM   Drainage Amount None 11/12/2018 week.     Written patient dismissal instructions given to patient and signed by patient or POA. Discharge Instructions         [address] [place] [facility] HYPERBAR[facility] TREAT[facility]  CENTER                                 Visit [place] Instructions / Physician Orders  DATE: 12/3/18     Home Care:     SUPPLIES ORDERED THRU:     Wound Location:  Left lower leg     Cleanse with:  Keep dry intact     Dressing Orders: Fibracol then Hydrafera blue foam zinc unna boot     Frequency:Keep dry and intact     Additional Orders: Increase protein to diet (meat, cheese, eggs, fish, peanut butter, nuts and beans)  Multivitamin daily     Pt to remain off work thru Dec 31,2018 due to medical condition     HYPERBARIC TREATMENT-                TREATMENT #     Your next appointment with [address] [place] [address] is in  1 week     ROOM TYPE   [] CHAIR     [] BED   [] EITHER        TIME [] 45 MIN     [] 60 MIN     (Please note your next appointment above and if you are unable to keep, kindly give a 24 hour notice. Thank you.)     If you experience any of the following, please call the [address] [place] [address] during business hours:  [phone]     * Increase in Pain  * Temperature over 101  * Increase in drainage from your wound  * Drainage with a foul odor  * Bleeding  * Increase in swelling  * Need for compression bandage changes due to slippage, breakthrough drainage.     If you need medical attention outside of the business hours of the [address] [place] [address] please contact your PCP or go to the nearest emergency room.     AVS REVIEWED    YES [x]     Patient Signature:__________________________________________________Date    Electronically signed by Clementina Moseley DPM on 12/4/2018 at 8:35 AM  Electronically signed by Al Park RN on 12/4/2018 at 9:09 AM      I performed a history and physical examination of the patient and discussed management with the resident.  I reviewed the residents note and agree with the documented findings and

## 2018-12-14 ENCOUNTER — HOSPITAL ENCOUNTER (OUTPATIENT)
Dept: WOUND CARE | Age: 58
Discharge: HOME | End: 2018-12-14
Payer: COMMERCIAL

## 2018-12-14 VITALS
HEART RATE: 99 BPM | BODY MASS INDEX: 33.12 KG/M2 | TEMPERATURE: 98.4 F | RESPIRATION RATE: 20 BRPM | SYSTOLIC BLOOD PRESSURE: 139 MMHG | HEIGHT: 64 IN | WEIGHT: 194 LBS | DIASTOLIC BLOOD PRESSURE: 78 MMHG

## 2018-12-14 DIAGNOSIS — R60.0 LEG EDEMA, LEFT: ICD-10-CM

## 2018-12-14 DIAGNOSIS — D23.72 BENIGN NEOPLASM OF SKIN OF LEFT LOWER EXTREMITY: Primary | ICD-10-CM

## 2018-12-14 DIAGNOSIS — I87.2 VENOUS INSUFFICIENCY OF LEFT LOWER EXTREMITY: ICD-10-CM

## 2018-12-14 DIAGNOSIS — L97.922 NON-PRESSURE CHRONIC ULCER OF LOWER LEG, LEFT, WITH FAT LAYER EXPOSED (HCC): Chronic | ICD-10-CM

## 2018-12-14 DIAGNOSIS — L88 PYODERMA GANGRENOSUM: ICD-10-CM

## 2018-12-14 DIAGNOSIS — I89.0 LYMPHEDEMA OF BOTH LOWER EXTREMITIES: ICD-10-CM

## 2018-12-14 PROCEDURE — 29580 STRAPPING UNNA BOOT: CPT

## 2018-12-14 PROCEDURE — 99213 OFFICE O/P EST LOW 20 MIN: CPT | Performed by: PODIATRIST

## 2018-12-14 PROCEDURE — 6370000000 HC RX 637 (ALT 250 FOR IP): Performed by: PODIATRIST

## 2018-12-14 RX ORDER — LIDOCAINE HYDROCHLORIDE 40 MG/ML
SOLUTION TOPICAL ONCE
Status: COMPLETED | OUTPATIENT
Start: 2018-12-14 | End: 2018-12-14

## 2018-12-14 RX ORDER — OXYCODONE HYDROCHLORIDE AND ACETAMINOPHEN 5; 325 MG/1; MG/1
1 TABLET ORAL EVERY 6 HOURS PRN
COMMUNITY
End: 2021-06-26

## 2018-12-14 RX ADMIN — LIDOCAINE HYDROCHLORIDE 5 ML: 40 SOLUTION TOPICAL at 10:53

## 2018-12-14 ASSESSMENT — PAIN DESCRIPTION - DESCRIPTORS: DESCRIPTORS: PINS AND NEEDLES

## 2018-12-14 ASSESSMENT — PAIN DESCRIPTION - LOCATION: LOCATION: LEG

## 2018-12-14 ASSESSMENT — PAIN DESCRIPTION - FREQUENCY: FREQUENCY: INTERMITTENT

## 2018-12-14 ASSESSMENT — PAIN DESCRIPTION - PROGRESSION: CLINICAL_PROGRESSION: NOT CHANGED

## 2018-12-14 ASSESSMENT — PAIN DESCRIPTION - ORIENTATION: ORIENTATION: LEFT

## 2018-12-14 ASSESSMENT — PAIN DESCRIPTION - PAIN TYPE: TYPE: CHRONIC PAIN

## 2018-12-14 ASSESSMENT — PAIN SCALES - GENERAL: PAINLEVEL_OUTOF10: 3

## 2018-12-14 ASSESSMENT — PAIN DESCRIPTION - ONSET: ONSET: ON-GOING

## 2018-12-14 NOTE — PROGRESS NOTES
Liss Verdugo Application   Below Knee    NAME:  Lila Moe  YOB: 1960  MEDICAL RECORD NUMBER:  [id number]  DATE:  12/14/2018     [] Applied moisturizing agent to dry skin as needed.  [x] Appied primary and secondary dressing as ordered     [x] Applied Unna roll from toes to knee overlapping each time.  [x] Applied ace wrap or coban from toes to below the knee.  [] Secured with tape and/or metal clips covered with tape.  [x] Instructed patient/caregiver to keep dressing dry and intact. DO NOT REMOVE DRESSING.  [x] Instructed pt/family/caregiver to report excessive draining, loose bandage, wet dressing, severe pain or tingling in toes.  [x] Applied Liss Verdugo dressing below the knee to { LEFT leg  lower leg(s)        Unna Boot(s) were applied per  Guidelines.      Electronically signed by Jaya Day RN on 12/14/2018 at 11:26 AM
Problem Relation Age of Onset    Other Mother     Kidney Disease Father        SOCIAL HISTORY    Social History   Substance Use Topics    Smoking status: Never Smoker    Smokeless tobacco: Never Used    Alcohol use No       ALLERGIES    Allergies   Allergen Reactions    Penicillins Hives, Itching and Swelling       MEDICATIONS    Current Outpatient Prescriptions on File Prior to Encounter   Medication Sig Dispense Refill    vitamin C (ASCORBIC ACID) 500 MG tablet Take 1,000 mg by mouth daily      Elastic Bandages & Supports (JOBST KNEE HIGH COMPRESSION SM) MISC Dispense Bilateral Jobst Below Knee 20-30mmHg compression stockings 3 each 0    Elastic Bandages & Supports (JOBST KNEE HIGH COMPRESSION SM) MISC Dispense Bilateral Jobst Below Knee 20-30mmHg compression stockings 3 each 0    Multiple Vitamins-Minerals (THERAPEUTIC MULTIVITAMIN-MINERALS) tablet Take 1 tablet by mouth daily      vitamin D (CHOLECALCIFEROL) 1000 UNIT TABS tablet Take 1,000 Units by mouth daily      zolpidem (AMBIEN) 10 MG tablet Take 10 mg by mouth nightly as needed for Sleep. Samantha Sleigh aspirin 81 MG tablet Take 81 mg by mouth 2 times daily        No current facility-administered medications on file prior to encounter. REVIEW OF SYSTEMS    Pertinent items are noted in HPI.     Objective:      /78   Pulse 99   Temp 98.4 °F (36.9 °C) (Tympanic)   Resp 20   Ht 5' 3.6\" (1.615 m)   Wt 194 lb (88 kg)   LMP  (LMP Unknown)   BMI 33.72 kg/m²     Wt Readings from Last 3 Encounters:   12/14/18 194 lb (88 kg)   12/04/18 194 lb (88 kg)   11/27/18 194 lb (88 kg)       PHYSICAL EXAM    General Appearance: alert and oriented to person, place and time, well-developed and well-nourished, in no acute distress  Skin: warm and granular wound to left lower anterior leg, minimal drainage noted, no surrounding erythema or odor    Incision 11/12/18 Foot Left (Active)   Number of days: 32       Wound 10/30/18 Leg Left;Medial Wound #2

## 2018-12-28 ENCOUNTER — HOSPITAL ENCOUNTER (OUTPATIENT)
Dept: WOUND CARE | Age: 58
Discharge: HOME | End: 2018-12-28
Payer: COMMERCIAL

## 2018-12-28 VITALS
BODY MASS INDEX: 34.38 KG/M2 | RESPIRATION RATE: 20 BRPM | WEIGHT: 194 LBS | DIASTOLIC BLOOD PRESSURE: 85 MMHG | HEIGHT: 63 IN | HEART RATE: 103 BPM | TEMPERATURE: 98.6 F | SYSTOLIC BLOOD PRESSURE: 141 MMHG

## 2018-12-28 DIAGNOSIS — I89.0 LYMPHEDEMA OF BOTH LOWER EXTREMITIES: ICD-10-CM

## 2018-12-28 DIAGNOSIS — D23.72 BENIGN NEOPLASM OF SKIN OF LEFT LOWER EXTREMITY: Primary | ICD-10-CM

## 2018-12-28 DIAGNOSIS — L97.922 NON-PRESSURE CHRONIC ULCER OF LOWER LEG, LEFT, WITH FAT LAYER EXPOSED (HCC): Chronic | ICD-10-CM

## 2018-12-28 DIAGNOSIS — L88 PYODERMA GANGRENOSUM: ICD-10-CM

## 2018-12-28 PROCEDURE — 99213 OFFICE O/P EST LOW 20 MIN: CPT | Performed by: PODIATRIST

## 2018-12-28 PROCEDURE — 29580 STRAPPING UNNA BOOT: CPT

## 2018-12-28 RX ORDER — LIDOCAINE HYDROCHLORIDE 40 MG/ML
SOLUTION TOPICAL ONCE
Status: DISCONTINUED | OUTPATIENT
Start: 2018-12-28 | End: 2018-12-29 | Stop reason: HOSPADM

## 2018-12-28 ASSESSMENT — PAIN SCALES - GENERAL: PAINLEVEL_OUTOF10: 0

## 2018-12-28 NOTE — PROGRESS NOTES
Stann Cram Application   Below Knee    NAME:  Yudelka Lloyd  YOB: 1960  MEDICAL RECORD NUMBER:  [id number]  DATE:  12/28/2018     [] Applied moisturizing agent to dry skin as needed.  [x] Appied primary and secondary dressing as ordered     [x] Applied Unna roll from toes to knee overlapping each time.  [x] Applied ace wrap or coban from toes to below the knee.  [] Secured with tape and/or metal clips covered with tape.  [x] Instructed patient/caregiver to keep dressing dry and intact. DO NOT REMOVE DRESSING.  [x] Instructed pt/family/caregiver to report excessive draining, loose bandage, wet dressing, severe pain or tingling in toes.  [x] Applied Stann Cram dressing below the knee to { LEFT leg lower leg(s)        Unna Boot(s) were applied per  Guidelines.      Electronically signed by Winston Cheney RN on 12/28/2018 at 10:21 AM

## 2019-01-02 ENCOUNTER — HOSPITAL ENCOUNTER (OUTPATIENT)
Dept: WOUND CARE | Age: 59
Discharge: HOME | End: 2019-01-02
Payer: COMMERCIAL

## 2019-01-02 VITALS
DIASTOLIC BLOOD PRESSURE: 85 MMHG | RESPIRATION RATE: 20 BRPM | SYSTOLIC BLOOD PRESSURE: 131 MMHG | TEMPERATURE: 99.2 F | HEART RATE: 100 BPM | BODY MASS INDEX: 34.38 KG/M2 | HEIGHT: 63 IN | WEIGHT: 194 LBS

## 2019-01-02 DIAGNOSIS — M79.605 PAIN IN LEFT LEG: ICD-10-CM

## 2019-01-02 PROCEDURE — 6370000000 HC RX 637 (ALT 250 FOR IP): Performed by: PODIATRIST

## 2019-01-02 PROCEDURE — 11042 DBRDMT SUBQ TIS 1ST 20SQCM/<: CPT | Performed by: PODIATRIST

## 2019-01-02 PROCEDURE — 11042 DBRDMT SUBQ TIS 1ST 20SQCM/<: CPT

## 2019-01-02 RX ORDER — LIDOCAINE HYDROCHLORIDE 40 MG/ML
1 SOLUTION TOPICAL ONCE
Status: COMPLETED | OUTPATIENT
Start: 2019-01-02 | End: 2019-01-02

## 2019-01-02 RX ORDER — GABAPENTIN 100 MG/1
300 CAPSULE ORAL 3 TIMES DAILY
COMMUNITY

## 2019-01-02 RX ADMIN — LIDOCAINE HYDROCHLORIDE 1 DOSE: 40 SOLUTION TOPICAL at 14:32

## 2019-01-02 ASSESSMENT — PAIN DESCRIPTION - DESCRIPTORS: DESCRIPTORS: PINS AND NEEDLES

## 2019-01-02 ASSESSMENT — PAIN DESCRIPTION - FREQUENCY: FREQUENCY: INTERMITTENT

## 2019-01-02 ASSESSMENT — PAIN DESCRIPTION - PAIN TYPE: TYPE: CHRONIC PAIN

## 2019-01-02 ASSESSMENT — PAIN DESCRIPTION - ONSET: ONSET: ON-GOING

## 2019-01-02 ASSESSMENT — PAIN DESCRIPTION - LOCATION: LOCATION: LEG

## 2019-01-02 ASSESSMENT — PAIN DESCRIPTION - ORIENTATION: ORIENTATION: LEFT

## 2019-01-02 ASSESSMENT — PAIN SCALES - GENERAL: PAINLEVEL_OUTOF10: 0

## 2019-01-02 ASSESSMENT — PAIN DESCRIPTION - PROGRESSION: CLINICAL_PROGRESSION: NOT CHANGED

## 2019-01-09 ENCOUNTER — HOSPITAL ENCOUNTER (OUTPATIENT)
Dept: WOUND CARE | Age: 59
Discharge: HOME | End: 2019-01-09
Payer: COMMERCIAL

## 2019-01-09 VITALS
BODY MASS INDEX: 34.38 KG/M2 | SYSTOLIC BLOOD PRESSURE: 147 MMHG | WEIGHT: 194 LBS | TEMPERATURE: 97.6 F | DIASTOLIC BLOOD PRESSURE: 95 MMHG | RESPIRATION RATE: 20 BRPM | HEIGHT: 63 IN | HEART RATE: 109 BPM

## 2019-01-09 PROCEDURE — 6370000000 HC RX 637 (ALT 250 FOR IP): Performed by: PODIATRIST

## 2019-01-09 PROCEDURE — 11042 DBRDMT SUBQ TIS 1ST 20SQCM/<: CPT | Performed by: PODIATRIST

## 2019-01-09 PROCEDURE — 11042 DBRDMT SUBQ TIS 1ST 20SQCM/<: CPT

## 2019-01-09 RX ORDER — LIDOCAINE HYDROCHLORIDE 40 MG/ML
1 SOLUTION TOPICAL ONCE
Status: COMPLETED | OUTPATIENT
Start: 2019-01-09 | End: 2019-01-09

## 2019-01-09 RX ADMIN — LIDOCAINE HYDROCHLORIDE 1 DOSE: 40 SOLUTION TOPICAL at 14:18

## 2019-01-09 ASSESSMENT — PAIN DESCRIPTION - LOCATION: LOCATION: LEG

## 2019-01-09 ASSESSMENT — PAIN DESCRIPTION - ORIENTATION: ORIENTATION: LEFT

## 2019-01-18 ENCOUNTER — HOSPITAL ENCOUNTER (OUTPATIENT)
Dept: WOUND CARE | Age: 59
Discharge: HOME | End: 2019-01-18
Payer: COMMERCIAL

## 2021-06-26 ENCOUNTER — APPOINTMENT (OUTPATIENT)
Dept: GENERAL RADIOLOGY | Age: 61
End: 2021-06-26
Payer: COMMERCIAL

## 2021-06-26 ENCOUNTER — HOSPITAL ENCOUNTER (EMERGENCY)
Age: 61
Discharge: HOME | End: 2021-06-26
Attending: EMERGENCY MEDICINE
Payer: COMMERCIAL

## 2021-06-26 VITALS
TEMPERATURE: 98.2 F | SYSTOLIC BLOOD PRESSURE: 96 MMHG | WEIGHT: 194 LBS | DIASTOLIC BLOOD PRESSURE: 52 MMHG | OXYGEN SATURATION: 96 % | BODY MASS INDEX: 34.37 KG/M2 | HEART RATE: 85 BPM | RESPIRATION RATE: 22 BRPM

## 2021-06-26 DIAGNOSIS — S90.32XA CONTUSION OF LEFT FOOT, INITIAL ENCOUNTER: Primary | ICD-10-CM

## 2021-06-26 PROCEDURE — 99282 EMERGENCY DEPT VISIT SF MDM: CPT

## 2021-06-26 PROCEDURE — 73630 X-RAY EXAM OF FOOT: CPT

## 2021-06-26 RX ORDER — OXYCODONE HYDROCHLORIDE AND ACETAMINOPHEN 5; 325 MG/1; MG/1
1 TABLET ORAL EVERY 6 HOURS PRN
Qty: 8 TABLET | Refills: 0 | Status: SHIPPED | OUTPATIENT
Start: 2021-06-26 | End: 2021-06-29

## 2021-06-26 RX ORDER — KETOROLAC TROMETHAMINE 30 MG/ML
INJECTION, SOLUTION INTRAMUSCULAR; INTRAVENOUS
Status: DISCONTINUED
Start: 2021-06-26 | End: 2021-06-26 | Stop reason: HOSPADM

## 2021-06-26 ASSESSMENT — ENCOUNTER SYMPTOMS
SHORTNESS OF BREATH: 0
ROS SKIN COMMENTS: NO BRUISING
BACK PAIN: 0

## 2021-06-26 ASSESSMENT — PAIN SCALES - GENERAL: PAINLEVEL_OUTOF10: 9

## 2021-06-26 NOTE — LETTER
[facility] ED  [address] [address]  Phone: [phone]             June 27, 2021    Patient: Dany Macdonald   YOB: 1960   Date of Visit: 6/26/2021       To Whom It May Concern:    Dany Macdonald was seen and treated in our emergency department on 6/26/2021. She may return to work on 6/27/21.       Sincerely,             Signature:__________________________________

## 2021-06-26 NOTE — ED NOTES
Pt presents to ED via private auto with c/o left foot pain. Pt states she slipped earlier in the evening and when she started dancing at a party her left foot started to hurt. Pt unable to ambulate to ED room without assist. Pt afebrile, vitals stable.       Kari Rdz RN  06/26/21 7601

## 2023-01-18 NOTE — ED PROVIDER NOTES
[address] [facility]  Emergency Medicine Department    Pt Name: Jl Bales  MRN: [id number]  [place] 1960  Date of evaluation: 6/26/2021  Provider: Tai Patel MD    CHIEF COMPLAINT     Chief Complaint   Patient presents with    Ankle Pain     left ankle        HISTORY OF PRESENT ILLNESS  (Location/Symptom, Timing/Onset, Context/Setting,Quality, Duration, Modifying Factors, Severity.)   Jl Bales is a 61 y.o. female who presents to the emergency department complaining of left foot pain. She states earlier in the day she slipped and fell. She initially felt well but later in the evening while she was at a birthday party, the pain got worse. She rates the pain as a 9 out of 10. She feels she can no longer bear weight because of the pain. Denies any other injuries. She has not taken anything for the pain. No history of injuries to this foot in the past.  She reports the pain is over the dorsum of the foot. She denies any ankle pain. Nursing Notes were reviewed. ALLERGIES     Penicillins    CURRENT MEDICATIONS       Discharge Medication List as of 6/26/2021  3:40 AM      CONTINUE these medications which have NOT CHANGED    Details   gabapentin (NEURONTIN) 100 MG capsule Take 300 mg by mouth 3 times daily. Juan Couch Historical Med      vitamin C (ASCORBIC ACID) 500 MG tablet Take 1,000 mg by mouth dailyHistorical Med      !! Elastic Bandages & Supports (JOBST KNEE HIGH COMPRESSION SM) MISC Disp-3 each, R-0, NormalDispense Bilateral Jobst Below Knee 20-30mmHg compression stockings      !! Elastic Bandages & Supports (JOBST KNEE HIGH COMPRESSION SM) MISC Disp-3 each, R-0, PrintDispense Bilateral Jobst Below Knee 20-30mmHg compression stockings      Multiple Vitamins-Minerals (THERAPEUTIC MULTIVITAMIN-MINERALS) tablet Take 1 tablet by mouth dailyHistorical Med      vitamin D (CHOLECALCIFEROL) 1000 UNIT TABS tablet Take 1,000 Units by mouth dailyHistorical Med      zolpidem (AMBIEN) 10 MG tablet Take 10 mg by mouth nightly as needed for Sleep. Erik Neighbours Historical Med      aspirin 81 MG tablet Take 81 mg by mouth 2 times daily Historical Med       !! - Potential duplicate medications found. Please discuss with provider. PAST MEDICAL HISTORY         Diagnosis Date    Arthritis     right knee       SURGICAL HISTORY           Procedure Laterality Date    ARTHROPLASTY Right     COLONOSCOPY      JOINT REPLACEMENT      LEG SURGERY      left lower leg    VA BIOPSY OF SKIN LESION Left 11/12/2018    LEG LESION BIOPSY EXCISION performed by Lucy Castanon DPM at [facility] INCIS/DRAIN THIGH/KNEE ABSCESS,DEEP Left 8/20/2018    LEG ULCER TISSUE BIOPSY W/CULTURE performed by Lucy Castanon DPM at [address]      WRIST SURGERY Left        FAMILY HISTORY           Problem Relation Age of Onset    Other Mother     Kidney Disease Father      Family Status   Relation Name Status    Mother  (Not Specified)    Father  (Not Specified)        SOCIAL HISTORY      reports that she has never smoked. She has never used smokeless tobacco. She reports that she does not drink alcohol and does not use drugs. REVIEW OF SYSTEMS    (2-9 systems for level 4, 10 or more for level 5)     Review of Systems   Constitutional: Negative for fever. Respiratory: Negative for shortness of breath. Cardiovascular: Negative for chest pain. Musculoskeletal: Positive for gait problem. Negative for back pain. +left foot pain   Skin:        No bruising   Allergic/Immunologic: Negative for immunocompromised state. Neurological: Negative for headaches. All other systems reviewed and are negative. PHYSICAL EXAM    (up to 7 for level 4, 8 or more for level 5)     ED Triage Vitals [06/26/21 0304]   BP Temp Temp Source Pulse Resp SpO2 Height Weight   (!) 96/52 98.2 °F (36.8 °C) Oral 85 22 96 % -- 194 lb (88 kg)       Physical Exam  Vitals and nursing note reviewed.    Constitutional:       General: She is not in acute distress. Appearance: She is well-developed. HENT:      Head: Normocephalic and atraumatic. Nose: Nose normal.      Mouth/Throat:      Mouth: Mucous membranes are moist.   Eyes:      Extraocular Movements: Extraocular movements intact. Cardiovascular:      Rate and Rhythm: Normal rate and regular rhythm. Heart sounds: Normal heart sounds. Pulmonary:      Effort: Pulmonary effort is normal. No respiratory distress. Breath sounds: Normal breath sounds. Abdominal:      Palpations: Abdomen is soft. Tenderness: There is no abdominal tenderness. There is no guarding. Musculoskeletal:         General: Tenderness (dorsum of left foot) and signs of injury present. No swelling or deformity. Normal range of motion. Cervical back: Normal range of motion and neck supple. Skin:     General: Skin is warm and dry. Findings: No bruising or erythema. Neurological:      Mental Status: She is alert and oriented to person, place, and time. Psychiatric:         Behavior: Behavior normal.         Thought Content: Thought content normal.         Judgment: Judgment normal.         DIAGNOSTIC RESULTS     RADIOLOGY:   Non-plain film images such as CT, Ultrasound and MRI are read by theradiologist. Plain radiographic images are visualized and preliminarily interpreted by the emergency physician with the below findings:    Interpretation per the Radiologist below, if available at the time of this note: -No evidence of fracture or dislocation    ED BEDSIDE ULTRASOUND:   Performed by ED Physician - none    LABS:  Labs Reviewed - No data to display    All other labs were within normal range or not returned as of this dictation.     EMERGENCYDEPARTMENT COURSE and DIFFERENTIAL DIAGNOSIS/MDM:   Vitals:    Vitals:    06/26/21 0304   BP: (!) 96/52   Pulse: 85   Resp: 22   Temp: 98.2 °F (36.8 °C)   TempSrc: Oral   SpO2: 96%   Weight: 194 lb (80kg)     72-year-old female presenting with left foot pain. Her exam is unremarkable without any swelling, ecchymosis, or deformity. She seems to be hypersensitive to pain, jumping before my hand can even touch her foot suggesting some degree of anxiety concerning her pain. X-ray reveals no bony abnormalities. Will provide an Ace wrap and a short course of pain medication to use as needed. She was offered crutches but declines at this time. CONSULTS:  None    PROCEDURES:  None indicated    FINAL IMPRESSION     1.  Contusion of left foot, initial encounter          DISPOSITION/PLAN   DISPOSITION      PATIENT REFERRED TO:   Vic Araujo MD  [address] [place] [address]  [phone]    Schedule an appointment as soon as possible for a visit   For Follow up, As needed    DISCHARGE MEDICATIONS:     Discharge Medication List as of 6/26/2021  3:40 AM        (Please note that portions of this note were completed with a voice recognition program.  Efforts were made to edit the dictations butoccasionally words are mis-transcribed.)    Chelsea Veronica MD  Attending Emergency Physician       Chelsea Veronica MD  06/26/21 8340 200 feet x 1 with BUE on IV pole; 20 feet x 1 without AD

## 2023-01-31 ENCOUNTER — APPOINTMENT (OUTPATIENT)
Dept: GENERAL RADIOLOGY | Age: 63
End: 2023-01-31
Payer: COMMERCIAL

## 2023-01-31 ENCOUNTER — HOSPITAL ENCOUNTER (EMERGENCY)
Age: 63
Discharge: HOME | End: 2023-01-31
Attending: EMERGENCY MEDICINE
Payer: COMMERCIAL

## 2023-01-31 VITALS
HEART RATE: 102 BPM | OXYGEN SATURATION: 99 % | DIASTOLIC BLOOD PRESSURE: 68 MMHG | BODY MASS INDEX: 32.99 KG/M2 | TEMPERATURE: 99.7 F | HEIGHT: 65 IN | RESPIRATION RATE: 16 BRPM | WEIGHT: 198 LBS | SYSTOLIC BLOOD PRESSURE: 132 MMHG

## 2023-01-31 DIAGNOSIS — U07.1 COVID-19: Primary | ICD-10-CM

## 2023-01-31 LAB
ABSOLUTE EOS #: 0.06 K/UL (ref 0–0.44)
ABSOLUTE IMMATURE GRANULOCYTE: 0.02 K/UL (ref 0–0.3)
ABSOLUTE LYMPH #: 1.4 K/UL (ref 1.1–3.7)
ABSOLUTE MONO #: 0.52 K/UL (ref 0.1–1.2)
ANION GAP SERPL CALCULATED.3IONS-SCNC: 12 MMOL/L (ref 9–17)
BASOPHILS # BLD: 0 % (ref 0–2)
BASOPHILS ABSOLUTE: 0.03 K/UL (ref 0–0.2)
BUN BLDV-MCNC: 11 MG/DL (ref 8–23)
BUN/CREAT BLD: 16 (ref 9–20)
CALCIUM SERPL-MCNC: 9.3 MG/DL (ref 8.6–10.4)
CHLORIDE BLD-SCNC: 101 MMOL/L (ref 98–107)
CO2: 23 MMOL/L (ref 20–31)
CREAT SERPL-MCNC: 0.67 MG/DL (ref 0.5–0.9)
EOSINOPHILS RELATIVE PERCENT: 1 % (ref 1–4)
GFR SERPL CREATININE-BSD FRML MDRD: >60 ML/MIN/1.73M2
GLUCOSE BLD-MCNC: 121 MG/DL (ref 70–99)
HCT VFR BLD CALC: 42 % (ref 36.3–47.1)
HEMOGLOBIN: 13.5 G/DL (ref 11.9–15.1)
IMMATURE GRANULOCYTES: 0 %
LYMPHOCYTES # BLD: 21 % (ref 24–43)
MCH RBC QN AUTO: 29.5 PG (ref 25.2–33.5)
MCHC RBC AUTO-ENTMCNC: 32.1 G/DL (ref 28.4–34.8)
MCV RBC AUTO: 91.9 FL (ref 82.6–102.9)
MONOCYTES # BLD: 8 % (ref 3–12)
NRBC AUTOMATED: 0 PER 100 WBC
PDW BLD-RTO: 13.2 % (ref 11.8–14.4)
PLATELET # BLD: 308 K/UL (ref 138–453)
PMV BLD AUTO: 10.2 FL (ref 8.1–13.5)
POTASSIUM SERPL-SCNC: 3.8 MMOL/L (ref 3.7–5.3)
RBC # BLD: 4.57 M/UL (ref 3.95–5.11)
SEG NEUTROPHILS: 70 % (ref 36–65)
SEGMENTED NEUTROPHILS ABSOLUTE COUNT: 4.67 K/UL (ref 1.5–8.1)
SODIUM BLD-SCNC: 136 MMOL/L (ref 135–144)
WBC # BLD: 6.7 K/UL (ref 3.5–11.3)

## 2023-01-31 PROCEDURE — 80048 BASIC METABOLIC PNL TOTAL CA: CPT

## 2023-01-31 PROCEDURE — 71045 X-RAY EXAM CHEST 1 VIEW: CPT

## 2023-01-31 PROCEDURE — 93005 ELECTROCARDIOGRAM TRACING: CPT | Performed by: EMERGENCY MEDICINE

## 2023-01-31 PROCEDURE — 6370000000 HC RX 637 (ALT 250 FOR IP): Performed by: NURSE PRACTITIONER

## 2023-01-31 PROCEDURE — 96372 THER/PROPH/DIAG INJ SC/IM: CPT

## 2023-01-31 PROCEDURE — 85025 COMPLETE CBC W/AUTO DIFF WBC: CPT

## 2023-01-31 PROCEDURE — 6360000002 HC RX W HCPCS: Performed by: NURSE PRACTITIONER

## 2023-01-31 PROCEDURE — 99285 EMERGENCY DEPT VISIT HI MDM: CPT

## 2023-01-31 RX ORDER — BENZONATATE 100 MG/1
200 CAPSULE ORAL ONCE
Status: COMPLETED | OUTPATIENT
Start: 2023-01-31 | End: 2023-01-31

## 2023-01-31 RX ORDER — DEXAMETHASONE SODIUM PHOSPHATE 10 MG/ML
6 INJECTION, SOLUTION INTRAMUSCULAR; INTRAVENOUS ONCE
Status: COMPLETED | OUTPATIENT
Start: 2023-01-31 | End: 2023-01-31

## 2023-01-31 RX ADMIN — DEXAMETHASONE SODIUM PHOSPHATE 6 MG: 10 INJECTION INTRAMUSCULAR; INTRAVENOUS at 13:57

## 2023-01-31 RX ADMIN — BENZONATATE 200 MG: 100 CAPSULE ORAL at 13:57

## 2023-01-31 ASSESSMENT — ENCOUNTER SYMPTOMS
SINUS PRESSURE: 0
ABDOMINAL PAIN: 0
RHINORRHEA: 1
COUGH: 1
COLOR CHANGE: 0
SORE THROAT: 0
SHORTNESS OF BREATH: 1
CHEST TIGHTNESS: 1
VOMITING: 0
DIARRHEA: 1
NAUSEA: 0

## 2023-01-31 NOTE — ED PROVIDER NOTES
Team [address] ED  eMERGENCY dEPARTMENT eNCOUnter      Pt Name: Tarik Lr  MRN: [id number]  [place] 1960  Date of evaluation: 1/31/2023  Provider: KATY Mendez 2050       Chief Complaint   Patient presents with    Shortness of Breath    Positive For Covid-19     Pt seen at urgent care and dx with covid. Pt states urgent care told her to come to ed for SOB. HISTORY OF PRESENT ILLNESS  (Location/Symptom, Timing/Onset, Context/Setting, Quality, Duration, Modifying Factors, Severity.)   Tarik Lr is a 58 y.o. female who presents to the emergency department. C/o sinus congestion/drainage, cough, SOB, chest tightness. Onset was 3 days ago. Reports a few episodes of diarrhea. She tested positive for Covid-19 at an urgent care today. She was given prescriptions for Proventil HFA, tessalon perles, and prednisone. She has not picked up the prescriptions and plans on doing so today after her ED visit. She has had Covid-19 once before. Rates her pain 0/10 at this time. Nursing Notes were reviewed. ALLERGIES     Penicillins    CURRENT MEDICATIONS       Discharge Medication List as of 1/31/2023  3:03 PM        CONTINUE these medications which have NOT CHANGED    Details   gabapentin (NEURONTIN) 100 MG capsule Take 300 mg by mouth 3 times daily. Wilma Rosales Historical Med      vitamin C (ASCORBIC ACID) 500 MG tablet Take 1,000 mg by mouth dailyHistorical Med      !! Elastic Bandages & Supports (JOBST KNEE HIGH COMPRESSION SM) MISC Disp-3 each, R-0, NormalDispense Bilateral Jobst Below Knee 20-30mmHg compression stockings      !! Elastic Bandages & Supports (JOBST KNEE HIGH COMPRESSION SM) MISC Disp-3 each, R-0, PrintDispense Bilateral Jobst Below Knee 20-30mmHg compression stockings      Multiple Vitamins-Minerals (THERAPEUTIC MULTIVITAMIN-MINERALS) tablet Take 1 tablet by mouth dailyHistorical Med      vitamin D (CHOLECALCIFEROL) 1000 UNIT TABS tablet Take 1,000 Units by mouth dailyHistorical Med      zolpidem (AMBIEN) 10 MG tablet Take 10 mg by mouth nightly as needed for Sleep. Ton Octaviano Historical Med      aspirin 81 MG tablet Take 81 mg by mouth 2 times daily Historical Med       !! - Potential duplicate medications found. Please discuss with provider. PAST MEDICAL HISTORY         Diagnosis Date    Arthritis     right knee       SURGICAL HISTORY           Procedure Laterality Date    ARTHROPLASTY Right     COLONOSCOPY      JOINT REPLACEMENT      LEG SURGERY      left lower leg    [place] BIOPSY OF SKIN LESION Left 11/12/2018    LEG LESION BIOPSY EXCISION performed by Mitch Seo DPM at [facility] I&D DEEP ABSC BURSA/HEMATOMA THIGH/KNEE REGION Left 8/20/2018    LEG ULCER TISSUE BIOPSY W/CULTURE performed by Mitch Seo DPM at [facility] Left          FAMILY HISTORY           Problem Relation Age of Onset    Other Mother     Kidney Disease Father      Family Status   Relation Name Status    Mother  (Not Specified)    Father  (Not Specified)        SOCIAL HISTORY      reports that she has never smoked. She has never used smokeless tobacco. She reports current drug use. Drug: Marijuana Kenard Snooks). She reports that she does not drink alcohol. REVIEW OF SYSTEMS    (2-9 systems for level 4, 10 or more for level 5)     Review of Systems   Constitutional:  Positive for fatigue. Negative for chills, diaphoresis and fever. HENT:  Positive for congestion and rhinorrhea. Negative for ear discharge, ear pain, postnasal drip, sinus pressure and sore throat. Respiratory:  Positive for cough, chest tightness and shortness of breath. Cardiovascular:  Negative for chest pain. Gastrointestinal:  Positive for diarrhea. Negative for abdominal pain, nausea and vomiting. Musculoskeletal:  Positive for myalgias. Negative for arthralgias, neck pain and neck stiffness. Skin:  Negative for color change and rash.    Neurological:  Negative for dizziness, weakness, light-headedness and headaches. Except as noted above the remainder of the review of systems was reviewed and negative. PHYSICAL EXAM    (up to 7 for level 4, 8 or more for level 5)     ED Triage Vitals [01/31/23 1314]   BP Temp Temp src Heart Rate Resp SpO2 Height Weight   132/68 99.7 °F (37.6 °C) -- (!) 102 16 100 % 5' 5\" (1.651 m) 198 lb (89.8 kg)     Physical Exam  Vitals reviewed. Constitutional:       General: She is not in acute distress. Appearance: She is well-developed. She is not diaphoretic. HENT:      Right Ear: External ear normal.      Left Ear: External ear normal.      Nose: Congestion present. Eyes:      General: No scleral icterus. Conjunctiva/sclera: Conjunctivae normal.   Cardiovascular:      Rate and Rhythm: Normal rate and regular rhythm. Pulmonary:      Effort: Pulmonary effort is normal. No respiratory distress. Breath sounds: Normal breath sounds. No wheezing or rales. Comments: Dry cough noted. Musculoskeletal:      Cervical back: Neck supple. Comments: Moves extremities. Skin:     General: Skin is warm and dry. Findings: No rash. Neurological:      Mental Status: She is alert and oriented to person, place, and time. GCS: GCS eye subscore is 4. GCS verbal subscore is 5. GCS motor subscore is 6. Motor: Motor function is intact. Coordination: Coordination is intact. Psychiatric:         Mood and Affect: Affect is tearful.          Behavior: Behavior normal.        DIAGNOSTIC RESULTS     RADIOLOGY:   Non-plain film images such as CT, Ultrasound and MRI are read by the radiologist. Plain radiographic images are visualized and preliminarily interpreted by the emergency physician with the below findings:    Interpretation per the Radiologist below, if available at the time of this note:    XR CHEST PORTABLE    Result Date: 1/31/2023  EXAMINATION: [address] [place] [address] 1/31/2023 2:10 pm COMPARISON: 01/31/2009 HISTORY: ORDERING SYSTEM PROVIDED HISTORY: SOB, cough TECHNOLOGIST PROVIDED HISTORY: SOB, cough Reason for Exam: SOB, cough, Covid-19 positive FINDINGS: The cardiomediastinal silhouette is within normal limits. There is no consolidation, pneumothorax or evidence for edema. No evidence for effusion. No acute osseous abnormality is identified. No acute airspace disease identified. LABS:  Labs Reviewed   BASIC METABOLIC PANEL - Abnormal; Notable for the following components:       Result Value    Glucose 121 (*)     All other components within normal limits   CBC WITH AUTO DIFFERENTIAL - Abnormal; Notable for the following components:    Seg Neutrophils 70 (*)     Lymphocytes 21 (*)     All other components within normal limits       All other labs were within normal range or not returned as of this dictation. EMERGENCY DEPARTMENT COURSE and DIFFERENTIAL DIAGNOSIS/MDM:   Vitals:    Vitals:    01/31/23 1314 01/31/23 1357   BP: 132/68    Pulse: (!) 102    Resp: 16    Temp: 99.7 °F (37.6 °C)    SpO2: 100% 99%   Weight: 198 lb (89.8 kg)    Height: 5' 5\" (1.651 m)        MEDICATIONS GIVEN IN THE ED:  Medications   dexamethasone (PF) (DECADRON) injection 6 mg (6 mg IntraMUSCular Given 1/31/23 1357)   benzonatate (TESSALON) capsule 200 mg (200 mg Oral Given 1/31/23 1357)       CLINICAL DECISION MAKING:  The patient presented alert with a nontoxic appearance and was seen in conjunction with Dr. Angela Kang. I will order labs including CBC, BMP. A chest x-ray will be completed. The patient will be monitored closely. The patient was involved in her plan of care through shared decision making. The testing that was ordered was discussed with the patient. Any medications that may have been ordered were discussed with the patient. I have reviewed the patient's previous medical records using the electronic health record that we have available that were pertinent to today's visit.       Labs and imaging were reviewed. CBC and BMP were unremarkable. Imaging was reviewed and reported by the radiologist. Results showed no acute airspace disease identified. Her VS were unremarkable. Findings were discussed with the patient. She is going to fill her prescriptions from the urgent care. Follow up with pcp for a recheck, further evaluation and treatment. Evaluation and treatment course in the ED, and plan of care upon discharge was discussed in length with the patient. Patient had no further questions prior to being discharged and was instructed to return to the ED for new or worsening symptoms. Care was provided during an unprecedented national emergency due to the novel coronavirus, Covid-19. MIPS Measure #65:  Appropriate Treatment for Patients with URI    [x] The patient was diagnosed with upper respiratory infection and was not prescribed or dispensed an antibiotic.  [SATISFIES MIPS]        FINAL IMPRESSION      1. COVID-19            Problem List  Patient Active Problem List   Diagnosis Code    Non-pressure chronic ulcer of lower leg, left, with fat layer exposed ([facility] [address] U04.931    Pyoderma gangrenosum L88    Leg edema, left R60.0    Acute post-operative pain G89.18    Venous insufficiency of left lower extremity I87.2    Acute kidney injury ([facility] [address] N17.9    Right hip pain M25.551    Lymphedema of both lower extremities I89.0    Benign neoplasm of skin of left lower extremity D23.72    Pain in left leg M79.605         DISPOSITION/PLAN   DISPOSITION Decision To Discharge 01/31/2023 03:02:46 PM      PATIENT REFERRED TO:   Chantal Curtis MD  [address]  [phone]    Schedule an appointment as soon as possible for a visit       [facility] ED  [address] [facility]  [phone]    If symptoms worsen, As needed    DISCHARGE MEDICATIONS:     Discharge Medication List as of 1/31/2023  3:03 PM              (Please note that portions of this note were completed with a voice recognition program.  Efforts were made to edit the dictations but occasionally words are mis-transcribed.)    KATY Pettit - KATY Delaney CNP  01/31/23 2896

## 2023-02-01 LAB
EKG ATRIAL RATE: 95 BPM
EKG P AXIS: 33 DEGREES
EKG P-R INTERVAL: 146 MS
EKG Q-T INTERVAL: 340 MS
EKG QRS DURATION: 74 MS
EKG QTC CALCULATION (BAZETT): 427 MS
EKG R AXIS: 1 DEGREES
EKG T AXIS: 54 DEGREES
EKG VENTRICULAR RATE: 95 BPM

## 2023-02-01 PROCEDURE — 93010 ELECTROCARDIOGRAM REPORT: CPT | Performed by: INTERNAL MEDICINE

## 2023-09-12 ENCOUNTER — HOSPITAL ENCOUNTER (EMERGENCY)
Age: 63
Discharge: HOME | End: 2023-09-12
Attending: STUDENT IN AN ORGANIZED HEALTH CARE EDUCATION/TRAINING PROGRAM
Payer: COMMERCIAL

## 2023-09-12 ENCOUNTER — APPOINTMENT (OUTPATIENT)
Dept: GENERAL RADIOLOGY | Age: 63
End: 2023-09-12
Payer: COMMERCIAL

## 2023-09-12 VITALS
HEIGHT: 65 IN | DIASTOLIC BLOOD PRESSURE: 92 MMHG | RESPIRATION RATE: 18 BRPM | TEMPERATURE: 98.4 F | OXYGEN SATURATION: 93 % | BODY MASS INDEX: 30.99 KG/M2 | HEART RATE: 74 BPM | WEIGHT: 186 LBS | SYSTOLIC BLOOD PRESSURE: 117 MMHG

## 2023-09-12 DIAGNOSIS — M79.10 MYALGIA: ICD-10-CM

## 2023-09-12 DIAGNOSIS — R06.02 SHORTNESS OF BREATH: ICD-10-CM

## 2023-09-12 DIAGNOSIS — R09.81 NASAL CONGESTION: ICD-10-CM

## 2023-09-12 DIAGNOSIS — R53.83 OTHER FATIGUE: Primary | ICD-10-CM

## 2023-09-12 LAB
ALBUMIN SERPL-MCNC: 4 G/DL (ref 3.5–5.2)
ALP SERPL-CCNC: 96 U/L (ref 35–104)
ALT SERPL-CCNC: 6 U/L (ref 5–33)
ANION GAP SERPL CALCULATED.3IONS-SCNC: 9 MMOL/L (ref 9–17)
AST SERPL-CCNC: 9 U/L
BACTERIA URNS QL MICRO: ABNORMAL
BASOPHILS # BLD: 0 K/UL (ref 0–0.2)
BASOPHILS NFR BLD: 1 % (ref 0–2)
BILIRUB SERPL-MCNC: 0.4 MG/DL (ref 0.3–1.2)
BILIRUB UR QL STRIP: NEGATIVE
BNP SERPL-MCNC: 63 PG/ML
BUN SERPL-MCNC: 10 MG/DL (ref 8–23)
CALCIUM SERPL-MCNC: 9.5 MG/DL (ref 8.6–10.4)
CASTS #/AREA URNS LPF: ABNORMAL /LPF
CHLORIDE SERPL-SCNC: 103 MMOL/L (ref 98–107)
CK SERPL-CCNC: 74 U/L (ref 26–192)
CLARITY UR: CLEAR
CO2 SERPL-SCNC: 24 MMOL/L (ref 20–31)
COLOR UR: YELLOW
CREAT SERPL-MCNC: 0.8 MG/DL (ref 0.5–0.9)
EOSINOPHIL # BLD: 0 K/UL (ref 0–0.4)
EOSINOPHILS RELATIVE PERCENT: 1 % (ref 0–4)
EPI CELLS #/AREA URNS HPF: ABNORMAL /HPF
ERYTHROCYTE [DISTWIDTH] IN BLOOD BY AUTOMATED COUNT: 13.8 % (ref 11.5–14.9)
FLUAV RNA RESP QL NAA+PROBE: NOT DETECTED
FLUBV RNA RESP QL NAA+PROBE: NOT DETECTED
GFR SERPL CREATININE-BSD FRML MDRD: >60 ML/MIN/1.73M2
GLUCOSE SERPL-MCNC: 100 MG/DL (ref 70–99)
GLUCOSE UR STRIP-MCNC: NEGATIVE MG/DL
HCT VFR BLD AUTO: 37.5 % (ref 36–46)
HGB BLD-MCNC: 12.8 G/DL (ref 12–16)
HGB UR QL STRIP.AUTO: NEGATIVE
INR PPP: 1.1
KETONES UR STRIP-MCNC: NEGATIVE MG/DL
LEUKOCYTE ESTERASE UR QL STRIP: ABNORMAL
LIPASE SERPL-CCNC: 11 U/L (ref 13–60)
LYMPHOCYTES NFR BLD: 1.3 K/UL (ref 1–4.8)
LYMPHOCYTES RELATIVE PERCENT: 25 % (ref 24–44)
MAGNESIUM SERPL-MCNC: 2.3 MG/DL (ref 1.6–2.6)
MCH RBC QN AUTO: 31.2 PG (ref 26–34)
MCHC RBC AUTO-ENTMCNC: 34.1 G/DL (ref 31–37)
MCV RBC AUTO: 91.5 FL (ref 80–100)
MONOCYTES NFR BLD: 0.6 K/UL (ref 0.1–1.3)
MONOCYTES NFR BLD: 12 % (ref 1–7)
NEUTROPHILS NFR BLD: 61 % (ref 36–66)
NEUTS SEG NFR BLD: 3.2 K/UL (ref 1.3–9.1)
NITRITE UR QL STRIP: NEGATIVE
PH UR STRIP: 6 [PH] (ref 5–8)
PLATELET # BLD AUTO: 293 K/UL (ref 150–450)
PMV BLD AUTO: 8.5 FL (ref 6–12)
POTASSIUM SERPL-SCNC: 3.8 MMOL/L (ref 3.7–5.3)
PROT SERPL-MCNC: 7.3 G/DL (ref 6.4–8.3)
PROT UR STRIP-MCNC: NEGATIVE MG/DL
PROTHROMBIN TIME: 14.6 SEC (ref 11.8–14.6)
RBC # BLD AUTO: 4.1 M/UL (ref 4–5.2)
RBC #/AREA URNS HPF: ABNORMAL /HPF
SARS-COV-2 RNA RESP QL NAA+PROBE: NOT DETECTED
SODIUM SERPL-SCNC: 136 MMOL/L (ref 135–144)
SOURCE: NORMAL
SP GR UR STRIP: 1.01 (ref 1–1.03)
SPECIMEN DESCRIPTION: NORMAL
TROPONIN I SERPL HS-MCNC: 8 NG/L (ref 0–14)
TROPONIN I SERPL HS-MCNC: <6 NG/L (ref 0–14)
UROBILINOGEN UR STRIP-ACNC: NORMAL EU/DL (ref 0–1)
WBC #/AREA URNS HPF: ABNORMAL /HPF
WBC OTHER # BLD: 5.2 K/UL (ref 3.5–11)

## 2023-09-12 PROCEDURE — 6370000000 HC RX 637 (ALT 250 FOR IP): Performed by: STUDENT IN AN ORGANIZED HEALTH CARE EDUCATION/TRAINING PROGRAM

## 2023-09-12 PROCEDURE — 83880 ASSAY OF NATRIURETIC PEPTIDE: CPT

## 2023-09-12 PROCEDURE — 80053 COMPREHEN METABOLIC PANEL: CPT

## 2023-09-12 PROCEDURE — 83735 ASSAY OF MAGNESIUM: CPT

## 2023-09-12 PROCEDURE — 93005 ELECTROCARDIOGRAM TRACING: CPT | Performed by: STUDENT IN AN ORGANIZED HEALTH CARE EDUCATION/TRAINING PROGRAM

## 2023-09-12 PROCEDURE — 83690 ASSAY OF LIPASE: CPT

## 2023-09-12 PROCEDURE — 82550 ASSAY OF CK (CPK): CPT

## 2023-09-12 PROCEDURE — 84484 ASSAY OF TROPONIN QUANT: CPT

## 2023-09-12 PROCEDURE — 71045 X-RAY EXAM CHEST 1 VIEW: CPT

## 2023-09-12 PROCEDURE — 85025 COMPLETE CBC W/AUTO DIFF WBC: CPT

## 2023-09-12 PROCEDURE — 96375 TX/PRO/DX INJ NEW DRUG ADDON: CPT

## 2023-09-12 PROCEDURE — 94640 AIRWAY INHALATION TREATMENT: CPT

## 2023-09-12 PROCEDURE — 36415 COLL VENOUS BLD VENIPUNCTURE: CPT

## 2023-09-12 PROCEDURE — 87636 SARSCOV2 & INF A&B AMP PRB: CPT

## 2023-09-12 PROCEDURE — 99285 EMERGENCY DEPT VISIT HI MDM: CPT

## 2023-09-12 PROCEDURE — 6360000002 HC RX W HCPCS: Performed by: STUDENT IN AN ORGANIZED HEALTH CARE EDUCATION/TRAINING PROGRAM

## 2023-09-12 PROCEDURE — 81001 URINALYSIS AUTO W/SCOPE: CPT

## 2023-09-12 PROCEDURE — 85610 PROTHROMBIN TIME: CPT

## 2023-09-12 PROCEDURE — 2580000003 HC RX 258: Performed by: STUDENT IN AN ORGANIZED HEALTH CARE EDUCATION/TRAINING PROGRAM

## 2023-09-12 PROCEDURE — 96374 THER/PROPH/DIAG INJ IV PUSH: CPT

## 2023-09-12 RX ORDER — ONDANSETRON 2 MG/ML
4 INJECTION INTRAMUSCULAR; INTRAVENOUS ONCE
Status: COMPLETED | OUTPATIENT
Start: 2023-09-12 | End: 2023-09-12

## 2023-09-12 RX ORDER — IPRATROPIUM BROMIDE AND ALBUTEROL SULFATE 2.5; .5 MG/3ML; MG/3ML
1 SOLUTION RESPIRATORY (INHALATION) ONCE
Status: COMPLETED | OUTPATIENT
Start: 2023-09-12 | End: 2023-09-12

## 2023-09-12 RX ORDER — ACETAMINOPHEN 500 MG
1000 TABLET ORAL ONCE
Status: COMPLETED | OUTPATIENT
Start: 2023-09-12 | End: 2023-09-12

## 2023-09-12 RX ORDER — 0.9 % SODIUM CHLORIDE 0.9 %
1000 INTRAVENOUS SOLUTION INTRAVENOUS ONCE
Status: COMPLETED | OUTPATIENT
Start: 2023-09-12 | End: 2023-09-12

## 2023-09-12 RX ORDER — KETOROLAC TROMETHAMINE 30 MG/ML
15 INJECTION, SOLUTION INTRAMUSCULAR; INTRAVENOUS ONCE
Status: COMPLETED | OUTPATIENT
Start: 2023-09-12 | End: 2023-09-12

## 2023-09-12 RX ADMIN — KETOROLAC TROMETHAMINE 15 MG: 30 INJECTION, SOLUTION INTRAMUSCULAR; INTRAVENOUS at 02:29

## 2023-09-12 RX ADMIN — ACETAMINOPHEN 1000 MG: 500 TABLET ORAL at 02:19

## 2023-09-12 RX ADMIN — IPRATROPIUM BROMIDE AND ALBUTEROL SULFATE 1 DOSE: .5; 2.5 SOLUTION RESPIRATORY (INHALATION) at 03:20

## 2023-09-12 RX ADMIN — SODIUM CHLORIDE 1000 ML: 9 INJECTION, SOLUTION INTRAVENOUS at 02:28

## 2023-09-12 RX ADMIN — WATER 125 MG: 1 INJECTION INTRAMUSCULAR; INTRAVENOUS; SUBCUTANEOUS at 02:31

## 2023-09-12 RX ADMIN — ONDANSETRON 4 MG: 2 INJECTION INTRAMUSCULAR; INTRAVENOUS at 02:29

## 2023-09-12 ASSESSMENT — ENCOUNTER SYMPTOMS
SORE THROAT: 0
EYE DISCHARGE: 0
NAUSEA: 1
ABDOMINAL PAIN: 0
EYE REDNESS: 0
DIARRHEA: 0
RHINORRHEA: 0
SHORTNESS OF BREATH: 1
VOMITING: 0
CHEST TIGHTNESS: 1

## 2023-09-12 ASSESSMENT — PAIN SCALES - GENERAL: PAINLEVEL_OUTOF10: 5

## 2023-09-12 ASSESSMENT — LIFESTYLE VARIABLES: HOW OFTEN DO YOU HAVE A DRINK CONTAINING ALCOHOL: NEVER

## 2023-09-12 ASSESSMENT — HEART SCORE: ECG: 0

## 2023-09-12 NOTE — ED NOTES
Discharge instructions reviewed with patient, noting all directions and education by provider. Patient verbalizes understanding of all information reviewed, gathered personal items, and transferred under own power off unit to lobby without incident.       Lynette Tristan RN  19/00/15 4681

## 2023-09-12 NOTE — ED NOTES
Mode of arrival (squad #, walk in, police, etc) : walk-in        Chief complaint(s):         Arrival Note (brief scenario, treatment PTA, etc). : Pt states she was at work when she started feeling ill with fatigue, dizziness, and SOB. Pt states that she has been feeling fatigued since last weekend. C= \"Have you ever felt that you should Cut down on your drinking? \"  No  A= \"Have people Annoyed you by criticizing your drinking? \"  No  G= \"Have you ever felt bad or Guilty about your drinking? \"  No  E= \"Have you ever had a drink as an Eye-opener first thing in the morning to steady your nerves or to help a hangover? \"  No      Deferred []      Reason for deferring: N/A    *If yes to two or more: probable alcohol abuse. Noemi Clemente RN  09/12/23 5101

## 2023-09-12 NOTE — ED PROVIDER NOTES
EMERGENCY DEPARTMENT ENCOUNTER    Pt Name: Bill Burkett  MRN: [id number]  [address] 1960  Date of evaluation: 9/12/23  CHIEF COMPLAINT       Chief Complaint   Patient presents with    Fatigue    Dizziness    Shortness of Breath     HISTORY OF PRESENT ILLNESS   61year-old presenting with congestion chest tightness shortness of breath myalgias    Feeling congestion sore throat chest tightness    Started tonight    Fatigued and nauseous    No abdominal pain vomiting    No leg swelling recent travel or surgery no history of blood clots no known medical problems              REVIEW OF SYSTEMS     Review of Systems   Constitutional:  Negative for chills and fever. HENT:  Negative for rhinorrhea and sore throat. Eyes:  Negative for discharge and redness. Respiratory:  Positive for chest tightness and shortness of breath. Cardiovascular:  Negative for chest pain. Gastrointestinal:  Positive for nausea. Negative for abdominal pain, diarrhea and vomiting. Genitourinary:  Negative for dysuria, frequency and urgency. Musculoskeletal:  Positive for myalgias. Negative for arthralgias. Skin:  Negative for rash. Neurological:  Negative for weakness and numbness. Psychiatric/Behavioral:  Negative for suicidal ideas. All other systems reviewed and are negative.     PASTMEDICAL HISTORY     Past Medical History:   Diagnosis Date    Arthritis     right knee     Past Problem List  Patient Active Problem List   Diagnosis Code    Non-pressure chronic ulcer of lower leg, left, with fat layer exposed ([address]) U49.444    Pyoderma gangrenosum L88    Leg edema, left R60.0    Acute post-operative pain G89.18    Venous insufficiency of left lower extremity I87.2    Acute kidney injury ([address]) N17.9    Right hip pain M25.551    Lymphedema of both lower extremities I89.0    Benign neoplasm of skin of left lower extremity D23.72    Pain in left leg M79.605     SURGICAL HISTORY       Past Surgical History:   Procedure Laterality

## 2023-09-14 NOTE — PLAN OF CARE
Patient Access Follow Up-  THIERRY's Needed:  Emergency Contact THIERRY: signed in chart Collateral Contact THIERRY: signed in chart OP Therapist THIERRY: signed in chart  Scheduling needs entered in Check Out Note: Yes  Social Work Follow Up-  Leave paperwork requested? No  Additional social work follow up requested?  No   Follow Up-  Is e-mail address accurate?  Yes.  , please send patient handbook.   Problem: Falls - Risk of:  Goal: Will remain free from falls  Will remain free from falls   Outcome: Ongoing  Bed remains in lowest position, call light within reach. Patient remains free of falls at this time. RN will continue to monitor. Problem: Pain:  Goal: Pain level will decrease  Pain level will decrease   Outcome: Ongoing  Pain assessed at regular intervals. Medications administered as requested for comfort. Pain remains at a tolerable level.

## 2023-09-18 ENCOUNTER — APPOINTMENT (OUTPATIENT)
Dept: CT IMAGING | Age: 63
End: 2023-09-18
Payer: COMMERCIAL

## 2023-09-18 ENCOUNTER — APPOINTMENT (OUTPATIENT)
Dept: GENERAL RADIOLOGY | Age: 63
End: 2023-09-18
Payer: COMMERCIAL

## 2023-09-18 ENCOUNTER — HOSPITAL ENCOUNTER (EMERGENCY)
Age: 63
Discharge: HOME | End: 2023-09-18
Attending: EMERGENCY MEDICINE
Payer: COMMERCIAL

## 2023-09-18 VITALS
TEMPERATURE: 98.2 F | WEIGHT: 176.8 LBS | DIASTOLIC BLOOD PRESSURE: 84 MMHG | RESPIRATION RATE: 27 BRPM | BODY MASS INDEX: 29.42 KG/M2 | HEART RATE: 53 BPM | SYSTOLIC BLOOD PRESSURE: 130 MMHG | OXYGEN SATURATION: 100 %

## 2023-09-18 DIAGNOSIS — J20.6 ACUTE BRONCHITIS DUE TO RHINOVIRUS: Primary | ICD-10-CM

## 2023-09-18 LAB
ANION GAP SERPL CALCULATED.3IONS-SCNC: 12 MMOL/L (ref 9–17)
B PARAP IS1001 DNA NPH QL NAA+NON-PROBE: NOT DETECTED
B PERT DNA SPEC QL NAA+PROBE: NOT DETECTED
BASOPHILS # BLD: <0.03 K/UL (ref 0–0.2)
BASOPHILS NFR BLD: 0 % (ref 0–2)
BILIRUB UR QL STRIP: NEGATIVE
BUN SERPL-MCNC: 10 MG/DL (ref 8–23)
BUN/CREAT SERPL: 14 (ref 9–20)
C PNEUM DNA NPH QL NAA+NON-PROBE: NOT DETECTED
CALCIUM SERPL-MCNC: 9.4 MG/DL (ref 8.6–10.4)
CHLORIDE SERPL-SCNC: 105 MMOL/L (ref 98–107)
CLARITY UR: ABNORMAL
CO2 SERPL-SCNC: 25 MMOL/L (ref 20–31)
COLOR UR: YELLOW
CREAT SERPL-MCNC: 0.7 MG/DL (ref 0.5–0.9)
EKG ATRIAL RATE: 51 BPM
EKG P AXIS: 19 DEGREES
EKG P-R INTERVAL: 158 MS
EKG Q-T INTERVAL: 458 MS
EKG QRS DURATION: 86 MS
EKG QTC CALCULATION (BAZETT): 422 MS
EKG R AXIS: 21 DEGREES
EKG T AXIS: 53 DEGREES
EKG VENTRICULAR RATE: 51 BPM
EOSINOPHIL # BLD: 0.08 K/UL (ref 0–0.44)
EOSINOPHILS RELATIVE PERCENT: 1 % (ref 1–4)
EPI CELLS #/AREA URNS HPF: NORMAL /HPF (ref 0–5)
ERYTHROCYTE [DISTWIDTH] IN BLOOD BY AUTOMATED COUNT: 12.8 % (ref 11.8–14.4)
FLUAV RNA NPH QL NAA+NON-PROBE: NOT DETECTED
FLUBV RNA NPH QL NAA+NON-PROBE: NOT DETECTED
GFR SERPL CREATININE-BSD FRML MDRD: >60 ML/MIN/1.73M2
GLUCOSE SERPL-MCNC: 95 MG/DL (ref 70–99)
GLUCOSE UR STRIP-MCNC: NEGATIVE MG/DL
HADV DNA NPH QL NAA+NON-PROBE: NOT DETECTED
HCO3 VENOUS: 24.6 MMOL/L (ref 22–29)
HCOV 229E RNA NPH QL NAA+NON-PROBE: NOT DETECTED
HCOV HKU1 RNA NPH QL NAA+NON-PROBE: NOT DETECTED
HCOV NL63 RNA NPH QL NAA+NON-PROBE: NOT DETECTED
HCOV OC43 RNA NPH QL NAA+NON-PROBE: NOT DETECTED
HCT VFR BLD AUTO: 40 % (ref 36.3–47.1)
HGB BLD-MCNC: 12.8 G/DL (ref 11.9–15.1)
HGB UR QL STRIP.AUTO: ABNORMAL
HMPV RNA NPH QL NAA+NON-PROBE: NOT DETECTED
HPIV1 RNA NPH QL NAA+NON-PROBE: NOT DETECTED
HPIV2 RNA NPH QL NAA+NON-PROBE: NOT DETECTED
HPIV3 RNA NPH QL NAA+NON-PROBE: NOT DETECTED
HPIV4 RNA NPH QL NAA+NON-PROBE: NOT DETECTED
IMM GRANULOCYTES # BLD AUTO: 0.02 K/UL (ref 0–0.3)
IMM GRANULOCYTES NFR BLD: 0 %
KETONES UR STRIP-MCNC: NEGATIVE MG/DL
LEUKOCYTE ESTERASE UR QL STRIP: ABNORMAL
LYMPHOCYTES NFR BLD: 2.96 K/UL (ref 1.1–3.7)
LYMPHOCYTES RELATIVE PERCENT: 40 % (ref 24–43)
M PNEUMO DNA NPH QL NAA+NON-PROBE: NOT DETECTED
MAGNESIUM SERPL-MCNC: 2 MG/DL (ref 1.6–2.6)
MCH RBC QN AUTO: 29.5 PG (ref 25.2–33.5)
MCHC RBC AUTO-ENTMCNC: 32 G/DL (ref 28.4–34.8)
MCV RBC AUTO: 92.2 FL (ref 82.6–102.9)
MONOCYTES NFR BLD: 0.41 K/UL (ref 0.1–1.2)
MONOCYTES NFR BLD: 6 % (ref 3–12)
NEUTROPHILS NFR BLD: 53 % (ref 36–65)
NEUTS SEG NFR BLD: 3.85 K/UL (ref 1.5–8.1)
NITRITE UR QL STRIP: NEGATIVE
NRBC BLD-RTO: 0 PER 100 WBC
O2 SAT, VEN: 86.3 % (ref 60–85)
PCO2, VEN: 34.1 MM HG (ref 41–51)
PH UR STRIP: 6 [PH] (ref 5–8)
PH VENOUS: 7.47 (ref 7.32–7.43)
PLATELET # BLD AUTO: 317 K/UL (ref 138–453)
PMV BLD AUTO: 10.4 FL (ref 8.1–13.5)
PO2, VEN: 48.1 MM HG (ref 30–50)
POSITIVE BASE EXCESS, VEN: 1.3 MMOL/L (ref 0–3)
POTASSIUM SERPL-SCNC: 3.4 MMOL/L (ref 3.7–5.3)
PROT UR STRIP-MCNC: NEGATIVE MG/DL
RBC # BLD AUTO: 4.34 M/UL (ref 3.95–5.11)
RBC #/AREA URNS HPF: NORMAL /HPF (ref 0–2)
RSV RNA NPH QL NAA+NON-PROBE: NOT DETECTED
RV+EV RNA NPH QL NAA+NON-PROBE: DETECTED
SARS-COV-2 RNA NPH QL NAA+NON-PROBE: NOT DETECTED
SODIUM SERPL-SCNC: 142 MMOL/L (ref 135–144)
SP GR UR STRIP: 1.01 (ref 1–1.03)
SPECIMEN DESCRIPTION: ABNORMAL
UROBILINOGEN UR STRIP-ACNC: NORMAL EU/DL (ref 0–1)
WBC #/AREA URNS HPF: NORMAL /HPF (ref 0–5)
WBC OTHER # BLD: 7.3 K/UL (ref 3.5–11.3)

## 2023-09-18 PROCEDURE — 71046 X-RAY EXAM CHEST 2 VIEWS: CPT

## 2023-09-18 PROCEDURE — 93005 ELECTROCARDIOGRAM TRACING: CPT | Performed by: EMERGENCY MEDICINE

## 2023-09-18 PROCEDURE — 80048 BASIC METABOLIC PNL TOTAL CA: CPT

## 2023-09-18 PROCEDURE — 85025 COMPLETE CBC W/AUTO DIFF WBC: CPT

## 2023-09-18 PROCEDURE — 99285 EMERGENCY DEPT VISIT HI MDM: CPT

## 2023-09-18 PROCEDURE — 94640 AIRWAY INHALATION TREATMENT: CPT

## 2023-09-18 PROCEDURE — 2580000003 HC RX 258: Performed by: EMERGENCY MEDICINE

## 2023-09-18 PROCEDURE — 71260 CT THORAX DX C+: CPT

## 2023-09-18 PROCEDURE — 6370000000 HC RX 637 (ALT 250 FOR IP): Performed by: EMERGENCY MEDICINE

## 2023-09-18 PROCEDURE — 83735 ASSAY OF MAGNESIUM: CPT

## 2023-09-18 PROCEDURE — 0202U NFCT DS 22 TRGT SARS-COV-2: CPT

## 2023-09-18 PROCEDURE — 82803 BLOOD GASES ANY COMBINATION: CPT

## 2023-09-18 PROCEDURE — 94761 N-INVAS EAR/PLS OXIMETRY MLT: CPT

## 2023-09-18 PROCEDURE — 6360000004 HC RX CONTRAST MEDICATION: Performed by: EMERGENCY MEDICINE

## 2023-09-18 PROCEDURE — 93010 ELECTROCARDIOGRAM REPORT: CPT | Performed by: INTERNAL MEDICINE

## 2023-09-18 PROCEDURE — 81001 URINALYSIS AUTO W/SCOPE: CPT

## 2023-09-18 RX ORDER — GUAIFENESIN/DEXTROMETHORPHAN 100-10MG/5
5 SYRUP ORAL 3 TIMES DAILY PRN
Qty: 120 ML | Refills: 0 | Status: SHIPPED | OUTPATIENT
Start: 2023-09-18 | End: 2023-09-28

## 2023-09-18 RX ORDER — IPRATROPIUM BROMIDE AND ALBUTEROL SULFATE 2.5; .5 MG/3ML; MG/3ML
1 SOLUTION RESPIRATORY (INHALATION)
Status: DISCONTINUED | OUTPATIENT
Start: 2023-09-18 | End: 2023-09-18 | Stop reason: HOSPADM

## 2023-09-18 RX ORDER — SODIUM CHLORIDE 0.9 % (FLUSH) 0.9 %
10 SYRINGE (ML) INJECTION PRN
Status: DISCONTINUED | OUTPATIENT
Start: 2023-09-18 | End: 2023-09-18 | Stop reason: HOSPADM

## 2023-09-18 RX ORDER — 0.9 % SODIUM CHLORIDE 0.9 %
80 INTRAVENOUS SOLUTION INTRAVENOUS ONCE
Status: DISCONTINUED | OUTPATIENT
Start: 2023-09-18 | End: 2023-09-18 | Stop reason: HOSPADM

## 2023-09-18 RX ADMIN — Medication 80 ML: at 12:17

## 2023-09-18 RX ADMIN — SODIUM CHLORIDE, PRESERVATIVE FREE 10 ML: 5 INJECTION INTRAVENOUS at 12:17

## 2023-09-18 RX ADMIN — IPRATROPIUM BROMIDE AND ALBUTEROL SULFATE 1 DOSE: 2.5; .5 SOLUTION RESPIRATORY (INHALATION) at 10:54

## 2023-09-18 RX ADMIN — IOPAMIDOL 75 ML: 755 INJECTION, SOLUTION INTRAVENOUS at 12:17

## 2023-09-18 ASSESSMENT — ENCOUNTER SYMPTOMS
COUGH: 1
VOMITING: 1
NAUSEA: 1
SHORTNESS OF BREATH: 1

## 2023-09-18 NOTE — ED NOTES
Pt to ed c/o cough, shortness of breath, chills, n/v/d x2 weeks. Pt states she was seen at [facility] for same think 1 week ago. Pt states she took ATB that was given to her by pcp with no relief. Pt states OTC remedies are not working for symptoms. Pt a/o x4. Respirations equal and labored. Pt placed on full cardiac monitor. Call light in reach. Bed locked and in lowest position. Warm blanket provided for comfort.       Shay Cochran RN  09/18/23 0393

## 2023-09-18 NOTE — ED NOTES
Pt ambulatory to restroom independently with steady gait. Pt missed hat placed in toilet so not able to collect urine sample. Dr Edgardo Wilkinson notified. Per dr Edgardo Wilkinson it is ok for pt to have water to try and provide urine sample. Pt given glass of water.       [place], [place]  09/18/23 1122

## 2023-09-27 LAB
EKG ATRIAL RATE: 51 BPM
EKG P AXIS: 54 DEGREES
EKG P-R INTERVAL: 154 MS
EKG Q-T INTERVAL: 430 MS
EKG QRS DURATION: 80 MS
EKG QTC CALCULATION (BAZETT): 396 MS
EKG R AXIS: 59 DEGREES
EKG T AXIS: 50 DEGREES
EKG VENTRICULAR RATE: 51 BPM

## 2024-03-14 ENCOUNTER — HOSPITAL ENCOUNTER (OUTPATIENT)
Dept: PREADMISSION TESTING | Age: 64
Discharge: HOME | End: 2024-03-18

## 2024-03-14 NOTE — PROGRESS NOTES
[facility] Joint Replacement Pre-surgical Assessment    Scheduled Surgery Date: 4/4/24  Surgery Time: 0730    Surgeon: Dr. Clinton  Procedure: left Total Hip    Primary Insurance Coverage [employer] [place] PPO  Pre-op class attended 3/14/24    PCP: John Cook MD  Clearance received by PCP: Yes    Anticipated Discharge Plan: home  Agency (if applicable):     Significant PMH:   Surgical History    Procedure Laterality Date Comment Source   ARTHROPLASTY Right      COLONOSCOPY       JOINT REPLACEMENT       LEG SURGERY   left lower leg    [place] BIOPSY OF SKIN LESION Left 11/12/2018 LEG LESION BIOPSY EXCISION performed by Marino Bean DPM at [facility] OR    [place] I&D DEEP ABSC BURSA/HEMATOMA THIGH/KNEE REGION Left 8/20/2018 LEG ULCER TISSUE BIOPSY W/CULTURE performed by Marino Bean DPM at [facility] OR    TUBAL LIGATION       WRIST SURGERY Left                Medical History    Diagnosis Date Comment Source   Arthritis  right knee           Smoking history: none, Patient reports marijuana use.    Alcohol history: Never drinks    Concerns prior to surgery: None verbalized at this time.    Electronically signed by: Frances Hinkle RN on 3/14/2024 at 2:30 PM

## 2024-03-20 ENCOUNTER — HOSPITAL ENCOUNTER (OUTPATIENT)
Dept: PREADMISSION TESTING | Age: 64
Discharge: HOME | End: 2024-03-20
Payer: COMMERCIAL

## 2024-03-20 VITALS
BODY MASS INDEX: 30.49 KG/M2 | DIASTOLIC BLOOD PRESSURE: 81 MMHG | RESPIRATION RATE: 16 BRPM | TEMPERATURE: 97.1 F | HEART RATE: 68 BPM | WEIGHT: 183 LBS | SYSTOLIC BLOOD PRESSURE: 150 MMHG | HEIGHT: 65 IN | OXYGEN SATURATION: 96 %

## 2024-03-20 LAB
ABO + RH BLD: NORMAL
ANION GAP SERPL CALCULATED.3IONS-SCNC: 11 MMOL/L (ref 9–17)
ARM BAND NUMBER: NORMAL
BLOOD BANK SAMPLE EXPIRATION: NORMAL
BLOOD GROUP ANTIBODIES SERPL: NEGATIVE
BUN SERPL-MCNC: 12 MG/DL (ref 8–23)
BUN/CREAT SERPL: 17 (ref 9–20)
CALCIUM SERPL-MCNC: 9.4 MG/DL (ref 8.6–10.4)
CHLORIDE SERPL-SCNC: 101 MMOL/L (ref 98–107)
CO2 SERPL-SCNC: 27 MMOL/L (ref 20–31)
CREAT SERPL-MCNC: 0.7 MG/DL (ref 0.5–0.9)
ERYTHROCYTE [DISTWIDTH] IN BLOOD BY AUTOMATED COUNT: 13.2 % (ref 11.8–14.4)
GFR SERPL CREATININE-BSD FRML MDRD: >60 ML/MIN/1.73M2
GLUCOSE SERPL-MCNC: 93 MG/DL (ref 70–99)
HCT VFR BLD AUTO: 42.6 % (ref 36.3–47.1)
HGB BLD-MCNC: 13.7 G/DL (ref 11.9–15.1)
MCH RBC QN AUTO: 29.8 PG (ref 25.2–33.5)
MCHC RBC AUTO-ENTMCNC: 32.2 G/DL (ref 28.4–34.8)
MCV RBC AUTO: 92.6 FL (ref 82.6–102.9)
NRBC BLD-RTO: 0 PER 100 WBC
PLATELET # BLD AUTO: 319 K/UL (ref 138–453)
PMV BLD AUTO: 10.4 FL (ref 8.1–13.5)
POTASSIUM SERPL-SCNC: 4 MMOL/L (ref 3.7–5.3)
RBC # BLD AUTO: 4.6 M/UL (ref 3.95–5.11)
SODIUM SERPL-SCNC: 139 MMOL/L (ref 135–144)
WBC OTHER # BLD: 5.1 K/UL (ref 3.5–11.3)

## 2024-03-20 PROCEDURE — 36415 COLL VENOUS BLD VENIPUNCTURE: CPT

## 2024-03-20 PROCEDURE — 85027 COMPLETE CBC AUTOMATED: CPT

## 2024-03-20 PROCEDURE — 80048 BASIC METABOLIC PNL TOTAL CA: CPT

## 2024-03-20 PROCEDURE — 86901 BLOOD TYPING SEROLOGIC RH(D): CPT

## 2024-03-20 PROCEDURE — 86900 BLOOD TYPING SEROLOGIC ABO: CPT

## 2024-03-20 PROCEDURE — 93005 ELECTROCARDIOGRAM TRACING: CPT | Performed by: ANESTHESIOLOGY

## 2024-03-20 PROCEDURE — 87641 MR-STAPH DNA AMP PROBE: CPT

## 2024-03-20 PROCEDURE — 86850 RBC ANTIBODY SCREEN: CPT

## 2024-03-20 RX ORDER — GABAPENTIN 300 MG/1
300 CAPSULE ORAL ONCE
OUTPATIENT
Start: 2024-04-04

## 2024-03-20 RX ORDER — ACETAMINOPHEN 500 MG
1000 TABLET ORAL ONCE
OUTPATIENT
Start: 2024-04-04

## 2024-03-20 RX ORDER — CELECOXIB 200 MG/1
200 CAPSULE ORAL ONCE
OUTPATIENT
Start: 2024-04-04

## 2024-03-20 ASSESSMENT — HOOS JR
BENDING TO THE FLOOR TO PICK UP OBJECT: EXTREME
WALKING ON UNEVEN SURFACE: SEVERE
HOOS JR TOTAL INTERVAL SCORE: 15.633
RISING FROM SITTING: SEVERE
HOOS JR RAW SCORE: 22
SITTING: EXTREME
GOING UP OR DOWN STAIRS: EXTREME
LYING IN BED (TURNING OVER, MAINTAINING HIP POSITION): EXTREME
HOOS JR RAW SCORE: 22

## 2024-03-20 ASSESSMENT — PROMIS GLOBAL HEALTH SCALE
WHO IS THE PERSON COMPLETING THE PROMIS V1.1 SURVEY?: SELF
SUM OF RESPONSES TO QUESTIONS 3, 6, 7, & 8: 18
IN GENERAL, WOULD YOU SAY YOUR QUALITY OF LIFE IS...[ON A SCALE OF 1 (POOR) TO 5 (EXCELLENT)]: VERY GOOD
IN GENERAL, WOULD YOU SAY YOUR HEALTH IS...[ON A SCALE OF 1 (POOR) TO 5 (EXCELLENT)]: GOOD
IN GENERAL, HOW WOULD YOU RATE YOUR PHYSICAL HEALTH [ON A SCALE OF 1 (POOR) TO 5 (EXCELLENT)]?: VERY GOOD
SUM OF RESPONSES TO QUESTIONS 2, 4, 5, & 10: 15
IN GENERAL, HOW WOULD YOU RATE YOUR MENTAL HEALTH, INCLUDING YOUR MOOD AND YOUR ABILITY TO THINK [ON A SCALE OF 1 (POOR) TO 5 (EXCELLENT)]?: VERY GOOD
HOW IS THE PROMIS V1.1 BEING ADMINISTERED?: PAPER
IN GENERAL, PLEASE RATE HOW WELL YOU CARRY OUT YOUR USUAL SOCIAL ACTIVITIES (INCLUDES ACTIVITIES AT HOME, AT WORK, AND IN YOUR COMMUNITY, AND RESPONSIBILITIES AS A PARENT, CHILD, SPOUSE, EMPLOYEE, FRIEND, ETC) [ON A SCALE OF 1 (POOR) TO 5 (EXCELLENT)]?: FAIR
IN THE PAST 7 DAYS, HOW OFTEN HAVE YOU BEEN BOTHERED BY EMOTIONAL PROBLEMS, SUCH AS FEELING ANXIOUS, DEPRESSED, OR IRRITABLE [ON A SCALE FROM 1 (NEVER) TO 5 (ALWAYS)]?: SOMETIMES
TO WHAT EXTENT ARE YOU ABLE TO CARRY OUT YOUR EVERYDAY PHYSICAL ACTIVITIES SUCH AS WALKING, CLIMBING STAIRS, CARRYING GROCERIES, OR MOVING A CHAIR [ON A SCALE OF 1 (NOT AT ALL) TO 5 (COMPLETELY)]?: MODERATELY
IN GENERAL, HOW WOULD YOU RATE YOUR SATISFACTION WITH YOUR SOCIAL ACTIVITIES AND RELATIONSHIPS [ON A SCALE OF 1 (POOR) TO 5 (EXCELLENT)]?: VERY GOOD
IN THE PAST 7 DAYS, HOW WOULD YOU RATE YOUR FATIGUE ON AVERAGE [ON A SCALE FROM 1 (NONE) TO 5 (VERY SEVERE)]?: MODERATE
IN THE PAST 7 DAYS, HOW WOULD YOU RATE YOUR PAIN ON AVERAGE [ON A SCALE FROM 0 (NO PAIN) TO 10 (WORST IMAGINABLE PAIN)]?: 8

## 2024-03-20 ASSESSMENT — PAIN DESCRIPTION - LOCATION: LOCATION: HIP

## 2024-03-20 ASSESSMENT — PAIN DESCRIPTION - PAIN TYPE: TYPE: CHRONIC PAIN

## 2024-03-20 ASSESSMENT — PAIN SCALES - GENERAL: PAINLEVEL_OUTOF10: 8

## 2024-03-20 ASSESSMENT — PAIN DESCRIPTION - FREQUENCY: FREQUENCY: CONTINUOUS

## 2024-03-20 ASSESSMENT — PAIN DESCRIPTION - ORIENTATION: ORIENTATION: LEFT

## 2024-03-20 NOTE — PRE-PROCEDURE INSTRUCTIONS
On the Day of Your Surgery, Monday, 4/4/24, Please Arrive At 5:45 AM     Enter the hospital through the Main Entrance, take the lobby elevators to the second floor and check in at the Surgery Registration desk.     Continue to take your home medications as you normally do up to and including the night before surgery with the exception of blood thinning medications.    Blood Thinning Medications:  Please stop prescription blood thinning medications such as Apixaban (Eliquis); Clopidogrel (Plavix); Dabigatran (Pradaxa); Prasugrel (Effient); Rivaroxaban (Xarelto); Ticagrelor (Brilinta); Warfarin (Coumadin) only as directed by your surgeon and/or the prescribing physician    Some common examples of other medications that can thin your blood are: Aspirin, Ibuprofen (Advil, Motrin), Naproxen (Aleve), Meloxicam (Mobic), Celecoxib (Celebrex), Fish Oil, many Herbal Supplements.  These medications should usually be stopped at least 7 days prior to surgery.    None    Tylenol is OK to take for pain the week prior to surgery.    Failure to stop certain medications may interfere with your scheduled surgery.    If you receive instructions from your surgeon regarding what medications to stop prior to surgery, please follow those specific instructions.    If You Have Diabetes:  Do not take any of your diabetic medications, (injectables or by mouth) the morning of surgery unless otherwise instructed by the doctor who manages your diabetes. If you are taking insulin, contact the doctor the manages your diabetes for instructions about any changes to your insulin dosages the day before surgery.      Please take the following medication(s) the day of surgery with small sips of water:              None    Please use your inhaler(s) if needed and bring your inhaler(s) from home the day of surgery.    Showering Before Surgery:     You can play an important role in your own health by carefully washing before surgery. Shower the night before  [facility] you can expect quality medical and nursing care that is centered on you individual needs.  Our goal is to make your surgical experience as comfortable as possible.    Any questions about preparing for your surgery please call [phone].      ____________________________   ____________________________  Signature (Patient)                                 Signature (Nurse)                     Date

## 2024-03-21 LAB
MRSA, DNA, NASAL: NEGATIVE
SPECIMEN DESCRIPTION: NORMAL

## 2024-03-22 LAB
EKG ATRIAL RATE: 65 BPM
EKG P AXIS: 33 DEGREES
EKG P-R INTERVAL: 154 MS
EKG Q-T INTERVAL: 410 MS
EKG QRS DURATION: 80 MS
EKG QTC CALCULATION (BAZETT): 426 MS
EKG R AXIS: 32 DEGREES
EKG T AXIS: 27 DEGREES
EKG VENTRICULAR RATE: 65 BPM

## 2024-03-22 PROCEDURE — 93010 ELECTROCARDIOGRAM REPORT: CPT | Performed by: INTERNAL MEDICINE

## 2024-04-04 ENCOUNTER — ANESTHESIA (OUTPATIENT)
Dept: OPERATING ROOM | Age: 64
End: 2024-04-04
Payer: COMMERCIAL

## 2024-04-04 ENCOUNTER — APPOINTMENT (OUTPATIENT)
Dept: GENERAL RADIOLOGY | Age: 64
End: 2024-04-04
Attending: ORTHOPAEDIC SURGERY
Payer: COMMERCIAL

## 2024-04-04 ENCOUNTER — ANESTHESIA EVENT (OUTPATIENT)
Dept: OPERATING ROOM | Age: 64
End: 2024-04-04
Payer: COMMERCIAL

## 2024-04-04 ENCOUNTER — HOSPITAL ENCOUNTER (OUTPATIENT)
Age: 64
Discharge: HOME HEALTH | End: 2024-04-05
Attending: ORTHOPAEDIC SURGERY | Admitting: ORTHOPAEDIC SURGERY
Payer: COMMERCIAL

## 2024-04-04 DIAGNOSIS — G89.18 ACUTE POSTOPERATIVE PAIN: Primary | ICD-10-CM

## 2024-04-04 PROBLEM — M16.12 PRIMARY LOCALIZED OSTEOARTHRITIS OF LEFT HIP: Status: ACTIVE | Noted: 2024-04-04

## 2024-04-04 PROCEDURE — 2580000003 HC RX 258: Performed by: ANESTHESIOLOGY

## 2024-04-04 PROCEDURE — 6360000002 HC RX W HCPCS: Performed by: ORTHOPAEDIC SURGERY

## 2024-04-04 PROCEDURE — 6370000000 HC RX 637 (ALT 250 FOR IP): Performed by: ANESTHESIOLOGY

## 2024-04-04 PROCEDURE — 97530 THERAPEUTIC ACTIVITIES: CPT

## 2024-04-04 PROCEDURE — 6360000002 HC RX W HCPCS

## 2024-04-04 PROCEDURE — 97166 OT EVAL MOD COMPLEX 45 MIN: CPT

## 2024-04-04 PROCEDURE — 2709999900 HC NON-CHARGEABLE SUPPLY: Performed by: ORTHOPAEDIC SURGERY

## 2024-04-04 PROCEDURE — 7100000001 HC PACU RECOVERY - ADDTL 15 MIN: Performed by: ORTHOPAEDIC SURGERY

## 2024-04-04 PROCEDURE — 97110 THERAPEUTIC EXERCISES: CPT

## 2024-04-04 PROCEDURE — 3700000000 HC ANESTHESIA ATTENDED CARE: Performed by: ORTHOPAEDIC SURGERY

## 2024-04-04 PROCEDURE — C1776 JOINT DEVICE (IMPLANTABLE): HCPCS | Performed by: ORTHOPAEDIC SURGERY

## 2024-04-04 PROCEDURE — 97116 GAIT TRAINING THERAPY: CPT

## 2024-04-04 PROCEDURE — 3600000015 HC SURGERY LEVEL 5 ADDTL 15MIN: Performed by: ORTHOPAEDIC SURGERY

## 2024-04-04 PROCEDURE — 2580000003 HC RX 258: Performed by: ORTHOPAEDIC SURGERY

## 2024-04-04 PROCEDURE — 3600000005 HC SURGERY LEVEL 5 BASE: Performed by: ORTHOPAEDIC SURGERY

## 2024-04-04 PROCEDURE — 6360000002 HC RX W HCPCS: Performed by: ANESTHESIOLOGY

## 2024-04-04 PROCEDURE — 3700000001 HC ADD 15 MINUTES (ANESTHESIA): Performed by: ORTHOPAEDIC SURGERY

## 2024-04-04 PROCEDURE — 97535 SELF CARE MNGMENT TRAINING: CPT

## 2024-04-04 PROCEDURE — 7100000000 HC PACU RECOVERY - FIRST 15 MIN: Performed by: ORTHOPAEDIC SURGERY

## 2024-04-04 PROCEDURE — 97162 PT EVAL MOD COMPLEX 30 MIN: CPT

## 2024-04-04 PROCEDURE — 2500000003 HC RX 250 WO HCPCS

## 2024-04-04 PROCEDURE — 6370000000 HC RX 637 (ALT 250 FOR IP): Performed by: ORTHOPAEDIC SURGERY

## 2024-04-04 PROCEDURE — 2500000003 HC RX 250 WO HCPCS: Performed by: NURSE ANESTHETIST, CERTIFIED REGISTERED

## 2024-04-04 PROCEDURE — 6360000002 HC RX W HCPCS: Performed by: NURSE ANESTHETIST, CERTIFIED REGISTERED

## 2024-04-04 DEVICE — SLEEVE FEM -6MM OFFSET HIP TYP 1 TAPR FOR CERAMIC BIOLOX: Type: IMPLANTABLE DEVICE | Site: HIP | Status: FUNCTIONAL

## 2024-04-04 DEVICE — IMPLANTABLE DEVICE
Type: IMPLANTABLE DEVICE | Site: HIP | Status: FUNCTIONAL
Brand: G7® ACETABULAR SYSTEM

## 2024-04-04 DEVICE — IMPLANTABLE DEVICE: Type: IMPLANTABLE DEVICE | Site: HIP | Status: FUNCTIONAL

## 2024-04-04 DEVICE — G7 DUAL MOBILITY LINER 38MM C: Type: IMPLANTABLE DEVICE | Site: HIP | Status: FUNCTIONAL

## 2024-04-04 DEVICE — BEARING TIB 28X38 MM HIP VIVACIT-E: Type: IMPLANTABLE DEVICE | Site: HIP | Status: FUNCTIONAL

## 2024-04-04 DEVICE — HEAD FEM DIA28MM HIP BIOLOX DELT OPT FOR G7 ACET SYS: Type: IMPLANTABLE DEVICE | Site: HIP | Status: FUNCTIONAL

## 2024-04-04 RX ORDER — ROCURONIUM BROMIDE 10 MG/ML
INJECTION, SOLUTION INTRAVENOUS PRN
Status: DISCONTINUED | OUTPATIENT
Start: 2024-04-04 | End: 2024-04-04 | Stop reason: SDUPTHER

## 2024-04-04 RX ORDER — KETOROLAC TROMETHAMINE 30 MG/ML
30 INJECTION, SOLUTION INTRAMUSCULAR; INTRAVENOUS EVERY 6 HOURS
Status: DISCONTINUED | OUTPATIENT
Start: 2024-04-04 | End: 2024-04-05 | Stop reason: HOSPADM

## 2024-04-04 RX ORDER — GLYCOPYRROLATE 0.2 MG/ML
0.2 INJECTION INTRAMUSCULAR; INTRAVENOUS
Status: COMPLETED | OUTPATIENT
Start: 2024-04-04 | End: 2024-04-04

## 2024-04-04 RX ORDER — FENTANYL CITRATE 50 UG/ML
INJECTION, SOLUTION INTRAMUSCULAR; INTRAVENOUS PRN
Status: DISCONTINUED | OUTPATIENT
Start: 2024-04-04 | End: 2024-04-04 | Stop reason: SDUPTHER

## 2024-04-04 RX ORDER — OXYCODONE HYDROCHLORIDE 5 MG/1
10 TABLET ORAL EVERY 4 HOURS PRN
Status: DISCONTINUED | OUTPATIENT
Start: 2024-04-04 | End: 2024-04-05 | Stop reason: HOSPADM

## 2024-04-04 RX ORDER — LIDOCAINE HYDROCHLORIDE 10 MG/ML
1 INJECTION, SOLUTION EPIDURAL; INFILTRATION; INTRACAUDAL; PERINEURAL
Status: DISCONTINUED | OUTPATIENT
Start: 2024-04-05 | End: 2024-04-04 | Stop reason: HOSPADM

## 2024-04-04 RX ORDER — ONDANSETRON 2 MG/ML
INJECTION INTRAMUSCULAR; INTRAVENOUS PRN
Status: DISCONTINUED | OUTPATIENT
Start: 2024-04-04 | End: 2024-04-04 | Stop reason: SDUPTHER

## 2024-04-04 RX ORDER — FENTANYL CITRATE 50 UG/ML
25 INJECTION, SOLUTION INTRAMUSCULAR; INTRAVENOUS EVERY 5 MIN PRN
Status: DISCONTINUED | OUTPATIENT
Start: 2024-04-04 | End: 2024-04-04 | Stop reason: HOSPADM

## 2024-04-04 RX ORDER — SODIUM CHLORIDE 9 MG/ML
INJECTION, SOLUTION INTRAVENOUS PRN
Status: DISCONTINUED | OUTPATIENT
Start: 2024-04-04 | End: 2024-04-05 | Stop reason: HOSPADM

## 2024-04-04 RX ORDER — BISACODYL 5 MG/1
5 TABLET, DELAYED RELEASE ORAL DAILY
Status: DISCONTINUED | OUTPATIENT
Start: 2024-04-04 | End: 2024-04-05 | Stop reason: HOSPADM

## 2024-04-04 RX ORDER — HYDROXYZINE HYDROCHLORIDE 10 MG/1
10 TABLET, FILM COATED ORAL EVERY 8 HOURS PRN
Status: DISCONTINUED | OUTPATIENT
Start: 2024-04-04 | End: 2024-04-05 | Stop reason: HOSPADM

## 2024-04-04 RX ORDER — TRANEXAMIC ACID 100 MG/ML
INJECTION, SOLUTION INTRAVENOUS
Status: COMPLETED
Start: 2024-04-04 | End: 2024-04-04

## 2024-04-04 RX ORDER — SODIUM CHLORIDE 0.9 % (FLUSH) 0.9 %
5-40 SYRINGE (ML) INJECTION EVERY 12 HOURS SCHEDULED
Status: DISCONTINUED | OUTPATIENT
Start: 2024-04-04 | End: 2024-04-04 | Stop reason: HOSPADM

## 2024-04-04 RX ORDER — SODIUM CHLORIDE 9 MG/ML
INJECTION, SOLUTION INTRAVENOUS PRN
Status: DISCONTINUED | OUTPATIENT
Start: 2024-04-04 | End: 2024-04-04 | Stop reason: HOSPADM

## 2024-04-04 RX ORDER — PROPOFOL 10 MG/ML
INJECTION, EMULSION INTRAVENOUS PRN
Status: DISCONTINUED | OUTPATIENT
Start: 2024-04-04 | End: 2024-04-04 | Stop reason: SDUPTHER

## 2024-04-04 RX ORDER — ONDANSETRON 2 MG/ML
4 INJECTION INTRAMUSCULAR; INTRAVENOUS
Status: DISCONTINUED | OUTPATIENT
Start: 2024-04-04 | End: 2024-04-04 | Stop reason: HOSPADM

## 2024-04-04 RX ORDER — ASPIRIN 325 MG
325 TABLET, DELAYED RELEASE (ENTERIC COATED) ORAL 2 TIMES DAILY
Status: DISCONTINUED | OUTPATIENT
Start: 2024-04-04 | End: 2024-04-05 | Stop reason: HOSPADM

## 2024-04-04 RX ORDER — NALOXONE HYDROCHLORIDE 0.4 MG/ML
INJECTION, SOLUTION INTRAMUSCULAR; INTRAVENOUS; SUBCUTANEOUS PRN
Status: DISCONTINUED | OUTPATIENT
Start: 2024-04-04 | End: 2024-04-04 | Stop reason: HOSPADM

## 2024-04-04 RX ORDER — TRANEXAMIC ACID 100 MG/ML
INJECTION, SOLUTION INTRAVENOUS PRN
Status: DISCONTINUED | OUTPATIENT
Start: 2024-04-04 | End: 2024-04-04 | Stop reason: SDUPTHER

## 2024-04-04 RX ORDER — OXYCODONE HYDROCHLORIDE 5 MG/1
5 TABLET ORAL EVERY 4 HOURS PRN
Status: DISCONTINUED | OUTPATIENT
Start: 2024-04-04 | End: 2024-04-05 | Stop reason: HOSPADM

## 2024-04-04 RX ORDER — MIDAZOLAM HYDROCHLORIDE 1 MG/ML
INJECTION INTRAMUSCULAR; INTRAVENOUS PRN
Status: DISCONTINUED | OUTPATIENT
Start: 2024-04-04 | End: 2024-04-04 | Stop reason: SDUPTHER

## 2024-04-04 RX ORDER — SODIUM CHLORIDE 0.9 % (FLUSH) 0.9 %
5-40 SYRINGE (ML) INJECTION EVERY 12 HOURS SCHEDULED
Status: DISCONTINUED | OUTPATIENT
Start: 2024-04-04 | End: 2024-04-05 | Stop reason: HOSPADM

## 2024-04-04 RX ORDER — ACETAMINOPHEN 500 MG
1000 TABLET ORAL ONCE
Status: COMPLETED | OUTPATIENT
Start: 2024-04-04 | End: 2024-04-04

## 2024-04-04 RX ORDER — CELECOXIB 200 MG/1
200 CAPSULE ORAL ONCE
Status: COMPLETED | OUTPATIENT
Start: 2024-04-04 | End: 2024-04-04

## 2024-04-04 RX ORDER — ONDANSETRON 4 MG/1
4 TABLET, ORALLY DISINTEGRATING ORAL EVERY 8 HOURS PRN
Status: DISCONTINUED | OUTPATIENT
Start: 2024-04-04 | End: 2024-04-05 | Stop reason: HOSPADM

## 2024-04-04 RX ORDER — SODIUM CHLORIDE 0.9 % (FLUSH) 0.9 %
5-40 SYRINGE (ML) INJECTION PRN
Status: DISCONTINUED | OUTPATIENT
Start: 2024-04-04 | End: 2024-04-05 | Stop reason: HOSPADM

## 2024-04-04 RX ORDER — DEXAMETHASONE SODIUM PHOSPHATE 10 MG/ML
INJECTION, SOLUTION INTRAMUSCULAR; INTRAVENOUS PRN
Status: DISCONTINUED | OUTPATIENT
Start: 2024-04-04 | End: 2024-04-04 | Stop reason: SDUPTHER

## 2024-04-04 RX ORDER — LIDOCAINE HYDROCHLORIDE 20 MG/ML
INJECTION, SOLUTION EPIDURAL; INFILTRATION; INTRACAUDAL; PERINEURAL PRN
Status: DISCONTINUED | OUTPATIENT
Start: 2024-04-04 | End: 2024-04-04 | Stop reason: SDUPTHER

## 2024-04-04 RX ORDER — ONDANSETRON 2 MG/ML
4 INJECTION INTRAMUSCULAR; INTRAVENOUS EVERY 6 HOURS PRN
Status: DISCONTINUED | OUTPATIENT
Start: 2024-04-04 | End: 2024-04-05 | Stop reason: HOSPADM

## 2024-04-04 RX ORDER — HYDROMORPHONE HYDROCHLORIDE 1 MG/ML
0.5 INJECTION, SOLUTION INTRAMUSCULAR; INTRAVENOUS; SUBCUTANEOUS EVERY 5 MIN PRN
Status: DISCONTINUED | OUTPATIENT
Start: 2024-04-04 | End: 2024-04-04 | Stop reason: HOSPADM

## 2024-04-04 RX ORDER — GABAPENTIN 300 MG/1
300 CAPSULE ORAL ONCE
Status: COMPLETED | OUTPATIENT
Start: 2024-04-04 | End: 2024-04-04

## 2024-04-04 RX ORDER — ACETAMINOPHEN 325 MG/1
650 TABLET ORAL EVERY 6 HOURS
Status: DISCONTINUED | OUTPATIENT
Start: 2024-04-04 | End: 2024-04-05 | Stop reason: HOSPADM

## 2024-04-04 RX ORDER — SODIUM CHLORIDE 0.9 % (FLUSH) 0.9 %
5-40 SYRINGE (ML) INJECTION PRN
Status: DISCONTINUED | OUTPATIENT
Start: 2024-04-04 | End: 2024-04-04 | Stop reason: HOSPADM

## 2024-04-04 RX ORDER — LABETALOL HYDROCHLORIDE 5 MG/ML
INJECTION, SOLUTION INTRAVENOUS PRN
Status: DISCONTINUED | OUTPATIENT
Start: 2024-04-04 | End: 2024-04-04 | Stop reason: SDUPTHER

## 2024-04-04 RX ORDER — SODIUM CHLORIDE 9 MG/ML
INJECTION, SOLUTION INTRAVENOUS CONTINUOUS
Status: DISCONTINUED | OUTPATIENT
Start: 2024-04-04 | End: 2024-04-05 | Stop reason: HOSPADM

## 2024-04-04 RX ORDER — CYCLOBENZAPRINE HCL 10 MG
10 TABLET ORAL EVERY 12 HOURS PRN
Status: DISCONTINUED | OUTPATIENT
Start: 2024-04-04 | End: 2024-04-05 | Stop reason: HOSPADM

## 2024-04-04 RX ORDER — SODIUM CHLORIDE, SODIUM LACTATE, POTASSIUM CHLORIDE, CALCIUM CHLORIDE 600; 310; 30; 20 MG/100ML; MG/100ML; MG/100ML; MG/100ML
INJECTION, SOLUTION INTRAVENOUS CONTINUOUS
Status: DISCONTINUED | OUTPATIENT
Start: 2024-04-04 | End: 2024-04-04 | Stop reason: HOSPADM

## 2024-04-04 RX ADMIN — ONDANSETRON 4 MG: 2 INJECTION INTRAMUSCULAR; INTRAVENOUS at 09:36

## 2024-04-04 RX ADMIN — LABETALOL HYDROCHLORIDE 2.5 MG: 5 INJECTION, SOLUTION INTRAVENOUS at 07:46

## 2024-04-04 RX ADMIN — FENTANYL CITRATE 50 MCG: 50 INJECTION, SOLUTION INTRAMUSCULAR; INTRAVENOUS at 07:41

## 2024-04-04 RX ADMIN — KETOROLAC TROMETHAMINE 30 MG: 30 INJECTION, SOLUTION INTRAMUSCULAR; INTRAVENOUS at 21:01

## 2024-04-04 RX ADMIN — LABETALOL HYDROCHLORIDE 5 MG: 5 INJECTION, SOLUTION INTRAVENOUS at 08:16

## 2024-04-04 RX ADMIN — GLYCOPYRROLATE 0.2 MG: 0.2 INJECTION INTRAMUSCULAR; INTRAVENOUS at 10:52

## 2024-04-04 RX ADMIN — TRANEXAMIC ACID 1000 MG: 100 INJECTION, SOLUTION INTRAVENOUS at 09:14

## 2024-04-04 RX ADMIN — ASPIRIN 325 MG: 325 TABLET, COATED ORAL at 21:01

## 2024-04-04 RX ADMIN — ACETAMINOPHEN 650 MG: 325 TABLET ORAL at 18:59

## 2024-04-04 RX ADMIN — ROCURONIUM BROMIDE 50 MG: 10 INJECTION, SOLUTION INTRAVENOUS at 07:33

## 2024-04-04 RX ADMIN — PROPOFOL 50 MG: 10 INJECTION, EMULSION INTRAVENOUS at 08:50

## 2024-04-04 RX ADMIN — SUGAMMADEX 100 MG: 100 INJECTION, SOLUTION INTRAVENOUS at 10:00

## 2024-04-04 RX ADMIN — SODIUM CHLORIDE, POTASSIUM CHLORIDE, SODIUM LACTATE AND CALCIUM CHLORIDE: 600; 310; 30; 20 INJECTION, SOLUTION INTRAVENOUS at 09:07

## 2024-04-04 RX ADMIN — MIDAZOLAM 2 MG: 1 INJECTION INTRAMUSCULAR; INTRAVENOUS at 07:26

## 2024-04-04 RX ADMIN — LABETALOL HYDROCHLORIDE 5 MG: 5 INJECTION, SOLUTION INTRAVENOUS at 08:00

## 2024-04-04 RX ADMIN — BISACODYL 5 MG: 5 TABLET, COATED ORAL at 12:53

## 2024-04-04 RX ADMIN — SODIUM CHLORIDE, POTASSIUM CHLORIDE, SODIUM LACTATE AND CALCIUM CHLORIDE: 600; 310; 30; 20 INJECTION, SOLUTION INTRAVENOUS at 06:41

## 2024-04-04 RX ADMIN — HYDROMORPHONE HYDROCHLORIDE 0.5 MG: 1 INJECTION, SOLUTION INTRAMUSCULAR; INTRAVENOUS; SUBCUTANEOUS at 10:18

## 2024-04-04 RX ADMIN — HYDROMORPHONE HYDROCHLORIDE 0.5 MG: 1 INJECTION, SOLUTION INTRAMUSCULAR; INTRAVENOUS; SUBCUTANEOUS at 10:27

## 2024-04-04 RX ADMIN — OXYCODONE HYDROCHLORIDE 10 MG: 5 TABLET ORAL at 21:50

## 2024-04-04 RX ADMIN — FENTANYL CITRATE 150 MCG: 50 INJECTION, SOLUTION INTRAMUSCULAR; INTRAVENOUS at 07:33

## 2024-04-04 RX ADMIN — GLYCOPYRROLATE 0.2 MG: 0.2 INJECTION INTRAMUSCULAR; INTRAVENOUS at 10:59

## 2024-04-04 RX ADMIN — PROPOFOL 200 MG: 10 INJECTION, EMULSION INTRAVENOUS at 07:33

## 2024-04-04 RX ADMIN — ACETAMINOPHEN 1000 MG: 500 TABLET ORAL at 06:35

## 2024-04-04 RX ADMIN — PROPOFOL 50 MCG/KG/MIN: 10 INJECTION, EMULSION INTRAVENOUS at 08:15

## 2024-04-04 RX ADMIN — FENTANYL CITRATE 50 MCG: 50 INJECTION, SOLUTION INTRAMUSCULAR; INTRAVENOUS at 08:12

## 2024-04-04 RX ADMIN — VANCOMYCIN HYDROCHLORIDE 1500 MG: 5 INJECTION, POWDER, LYOPHILIZED, FOR SOLUTION INTRAVENOUS at 21:52

## 2024-04-04 RX ADMIN — LIDOCAINE HYDROCHLORIDE 60 MG: 20 INJECTION, SOLUTION EPIDURAL; INFILTRATION; INTRACAUDAL; PERINEURAL at 07:33

## 2024-04-04 RX ADMIN — CELECOXIB 200 MG: 200 CAPSULE ORAL at 06:36

## 2024-04-04 RX ADMIN — LABETALOL HYDROCHLORIDE 5 MG: 5 INJECTION, SOLUTION INTRAVENOUS at 07:51

## 2024-04-04 RX ADMIN — LABETALOL HYDROCHLORIDE 2.5 MG: 5 INJECTION, SOLUTION INTRAVENOUS at 08:10

## 2024-04-04 RX ADMIN — TRANEXAMIC ACID 1000 MG: 100 INJECTION, SOLUTION INTRAVENOUS at 07:51

## 2024-04-04 RX ADMIN — ACETAMINOPHEN 650 MG: 325 TABLET ORAL at 12:53

## 2024-04-04 RX ADMIN — OXYCODONE HYDROCHLORIDE 10 MG: 5 TABLET ORAL at 15:56

## 2024-04-04 RX ADMIN — SODIUM CHLORIDE: 9 INJECTION, SOLUTION INTRAVENOUS at 21:12

## 2024-04-04 RX ADMIN — LABETALOL HYDROCHLORIDE 5 MG: 5 INJECTION, SOLUTION INTRAVENOUS at 08:51

## 2024-04-04 RX ADMIN — DEXAMETHASONE SODIUM PHOSPHATE 10 MG: 10 INJECTION, SOLUTION INTRAMUSCULAR; INTRAVENOUS at 07:37

## 2024-04-04 RX ADMIN — GABAPENTIN 300 MG: 300 CAPSULE ORAL at 06:36

## 2024-04-04 RX ADMIN — VANCOMYCIN HYDROCHLORIDE 1250 MG: 5 INJECTION, POWDER, LYOPHILIZED, FOR SOLUTION INTRAVENOUS at 07:43

## 2024-04-04 RX ADMIN — SODIUM CHLORIDE: 9 INJECTION, SOLUTION INTRAVENOUS at 11:26

## 2024-04-04 ASSESSMENT — PAIN SCALES - GENERAL
PAINLEVEL_OUTOF10: 4
PAINLEVEL_OUTOF10: 7
PAINLEVEL_OUTOF10: 6
PAINLEVEL_OUTOF10: 4
PAINLEVEL_OUTOF10: 3
PAINLEVEL_OUTOF10: 9
PAINLEVEL_OUTOF10: 8
PAINLEVEL_OUTOF10: 5
PAINLEVEL_OUTOF10: 7
PAINLEVEL_OUTOF10: 8

## 2024-04-04 ASSESSMENT — PAIN - FUNCTIONAL ASSESSMENT
PAIN_FUNCTIONAL_ASSESSMENT: PREVENTS OR INTERFERES SOME ACTIVE ACTIVITIES AND ADLS
PAIN_FUNCTIONAL_ASSESSMENT: 0-10

## 2024-04-04 ASSESSMENT — PAIN DESCRIPTION - LOCATION
LOCATION: HIP

## 2024-04-04 ASSESSMENT — PAIN DESCRIPTION - ORIENTATION
ORIENTATION: LEFT

## 2024-04-04 ASSESSMENT — PAIN DESCRIPTION - DESCRIPTORS
DESCRIPTORS: ACHING
DESCRIPTORS: SHARP;STABBING
DESCRIPTORS: ACHING
DESCRIPTORS: ACHING
DESCRIPTORS: ACHING;CRUSHING;DISCOMFORT
DESCRIPTORS: ACHING
DESCRIPTORS: ACHING

## 2024-04-04 ASSESSMENT — PAIN DESCRIPTION - PAIN TYPE
TYPE: SURGICAL PAIN
TYPE: SURGICAL PAIN

## 2024-04-04 ASSESSMENT — PAIN DESCRIPTION - ONSET
ONSET: GRADUAL
ONSET: GRADUAL

## 2024-04-04 ASSESSMENT — PAIN DESCRIPTION - FREQUENCY
FREQUENCY: CONTINUOUS
FREQUENCY: CONTINUOUS

## 2024-04-04 NOTE — H&P
Interval H&P Note    Pt Name: Anjelica Kirk  MRN: [id number]  YOB: 1960  Date of evaluation: 4/4/2024      [x] I have reviewed the hardcopy PCP Preoperative Note by Dr John Cook dated 3/18/24 labeled in sort chart for the Interval History and Physical note.     [x] I have examined  Anjelica Kirk, a 63 y.o. female who presents for the scheduled LEFT HIP TOTAL ARTHROPLASTY ANTERIOR APPROACH by Alen Clinton MD for Primary osteoarthritis of left hip. The patient denies new health changes, fever, chills, wheezing, cough, increased SOB, chest pain, open sores or wounds.    Vital signs: BP (!) 163/73   Pulse 75   Temp 97.7 °F (36.5 °C)   Resp 16   Ht 1.651 m (5' 5\")   Wt 83 kg (183 lb)   LMP  (LMP Unknown)   SpO2 99%   BMI 30.45 kg/m²     Allergies:  Penicillins    Medications:    Prior to Admission medications    Medication Sig Start Date End Date Taking? Authorizing Provider   Elastic Bandages & Supports (JOBST KNEE HIGH COMPRESSION SM) MISC Dispense Bilateral Jobst Below Knee 20-30mmHg compression stockings 9/25/18   Marino Bean A, DPM   Elastic Bandages & Supports (JOBST KNEE HIGH COMPRESSION SM) MISC Dispense Bilateral Jobst Below Knee 20-30mmHg compression stockings 9/25/18   Marino Bean, DPM         This is a 63 y.o.obese female who is pleasant, cooperative, alert and oriented x3, in no acute distress.    Heart: Heart sounds are normal.  HR 75 regular rate and rhythm without murmur, gallop or rub.   Lungs: Normal respiratory effort with equal expansion, good air exchange, unlabored and clear to auscultation without wheezes or rales bilaterally   Abdomen: soft, obese, nontender, nondistended with bowel sounds     Labs:  Recent Labs     03/20/24  1527   HGB 13.7   HCT 42.6   WBC 5.1   MCV 92.6         K 4.0      CO2 27   BUN 12   CREATININE 0.7   GLUCOSE 93       No results for input(s): \"COVID19\" in the last 720 hours.    Talia Damon, APRN - CNP  Electronically

## 2024-04-04 NOTE — DISCHARGE INSTR - COC
Continuity of Care Form    Patient Name: Anjelica Kirk   :  1960  MRN:  [id number]    Admit date:  2024  Discharge date:  2024    Code Status Order: Prior   Advance Directives:     Admitting Physician:  Alen Clinton MD  PCP: John Cook MD    Discharging Nurse: Ron DE JESUS  Discharging Hospital Unit/Room#: [facility] OR Pool/NONE  Discharging Unit Phone Number: [phone]    Emergency Contact:   Extended Emergency Contact Information  Primary Emergency Contact: Ada Wisdom  Home Phone: [phone]  Mobile Phone: [phone]  Relation: Child  Secondary Emergency Contact: Trupti Patel  Home Phone: [phone]  Relation: Child    Past Surgical History:  Past Surgical History:   Procedure Laterality Date    COLONOSCOPY      diagnosed with diverticulitis    JOINT REPLACEMENT Right     Rt hip    LEG SURGERY      left lower leg- patient got bit by a spider.    [place] BIOPSY OF SKIN LESION Left 2018    LEG LESION BIOPSY EXCISION performed by Marino Bean DPM at [facility] OR    [place] I&D DEEP ABSC BURSA/HEMATOMA THIGH/KNEE REGION Left 2018    LEG ULCER TISSUE BIOPSY W/CULTURE performed by Marino Bean DPM at [facility] OR    TUBAL LIGATION      WISDOM TOOTH EXTRACTION      WRIST SURGERY Left     sheet metal cut the patient       Immunization History:   Immunization History   Administered Date(s) Administered    COVID-19, J&J, (age 18y+), IM, 0.5 mL 2021       Active Problems:  Patient Active Problem List   Diagnosis Code    Non-pressure chronic ulcer of lower leg, left, with fat layer exposed ([facility]) L97.922    Pyoderma gangrenosum L88    Leg edema, left R60.0    Acute post-operative pain G89.18    Venous insufficiency of left lower extremity I87.2    Acute kidney injury ([facility]) N17.9    Right hip pain M25.551    Lymphedema of both lower extremities I89.0    Benign neoplasm of skin of left lower extremity D23.72    Pain in left leg M79.605    Primary localized osteoarthritis of left hip M16.12

## 2024-04-04 NOTE — ANESTHESIA PRE PROCEDURE
PM    RDW 13.2 03/20/2024 03:27 PM     03/20/2024 03:27 PM       CMP:   Lab Results   Component Value Date/Time     03/20/2024 03:27 PM    K 4.0 03/20/2024 03:27 PM     03/20/2024 03:27 PM    CO2 27 03/20/2024 03:27 PM    BUN 12 03/20/2024 03:27 PM    CREATININE 0.7 03/20/2024 03:27 PM    GFRAA 122.2 09/22/2023 10:43 AM    AGRATIO NOT REPORTED 08/17/2018 11:52 AM    LABGLOM >60 03/20/2024 03:27 PM    GLUCOSE 93 03/20/2024 03:27 PM    GLUCOSE 100 09/22/2023 10:43 AM    PROT 6.9 09/22/2023 10:43 AM    CALCIUM 9.4 03/20/2024 03:27 PM    BILITOT 0.4 09/22/2023 10:43 AM    ALKPHOS 92 09/22/2023 10:43 AM    ALKPHOS 96 09/12/2023 02:34 AM    AST 17 09/22/2023 10:43 AM    ALT 13 09/22/2023 10:43 AM       POC Tests: No results for input(s): \"POCGLU\", \"POCNA\", \"POCK\", \"POCCL\", \"POCBUN\", \"POCHEMO\", \"POCHCT\" in the last 72 hours.    Coags:   Lab Results   Component Value Date/Time    PROTIME 14.6 09/12/2023 02:34 AM    INR 1.1 09/12/2023 02:34 AM       HCG (If Applicable): No results found for: \"PREGTESTUR\", \"PREGSERUM\", \"HCG\", \"HCGQUANT\"     ABGs: No results found for: \"PHART\", \"PO2ART\", \"MGY0GIW\", \"AYT5MTC\", \"BEART\", \"I4AKUAEU\"     Type & Screen (If Applicable):  No results found for: \"LABABO\", \"LABRH\"    Drug/Infectious Status (If Applicable):  No results found for: \"HIV\", \"HEPCAB\"    COVID-19 Screening (If Applicable):   Lab Results   Component Value Date/Time    COVID19 Not Detected 09/18/2023 10:13 AM           Anesthesia Evaluation  Patient summary reviewed   no history of anesthetic complications:   Airway: Mallampati: III  TM distance: >3 FB   Neck ROM: full  Mouth opening: > = 3 FB   Dental:    (+) upper dentures, lower dentures and edentulous      Pulmonary: breath sounds clear to auscultation                            ROS comment: CXR 1/2022:  Findings:  Cardiomediastinal silhouette is within normal range.  Lungs are grossly clear except for 1.2 cm vague density at the right lower lobe.  This

## 2024-04-04 NOTE — CARE COORDINATION
CM spoke with patient to discuss transitional planning. Patient lives alone in a one level apartment and has sc, walker, and cane. Patient is s/p LTHA and plans to return home and is requesting [facility] services with [facility]. Referral sent.

## 2024-04-04 NOTE — OP NOTE
Operative Note      Patient: Anjelica Kirk  YOB: 1960  MRN: [id number]    Date of Procedure: 4/4/2024    Pre-Op Diagnosis Codes:     * Primary osteoarthritis of left hip [M16.12]    Post-Op Diagnosis: Same       Procedure(s):  LEFT HIP TOTAL ARTHROPLASTY ANTERIOR APPROACH    Surgeon(s):  Alen Clinton MD    Assistant:   Surgical Assistant: Gerson Bertrand  Resident: Leroy Paredes MD    Anesthesia: General    Estimated Blood Loss (mL): 200     Complications: None    Specimens:   * No specimens in log *    Implants:  Implant Name Type Inv. Item Serial No.  Lot No. LRB No. Used Action   G7 FINNED 3 HOLE SHELL 48C - [id number]  G7 FINNED 3 HOLE SHELL 48C  [facility] [id number] Left 1 Implanted   BONE SCREW 6.5X30 SELF-TAP - [id number]  BONE SCREW 6.5X30 SELF-TAP  [employer]- [id number] Left 1 Implanted   G7 DUAL MOBILITY LINER 38MM C - [id number]  G7 DUAL MOBILITY LINER 38MM C  [employer]- [id number] Left 1 Implanted   BEARING TIB 28X38 MM HIP VIVACIT-E - [id number]  BEARING TIB 28X38 MM HIP VIVACIT-E  [employer]- [id number] Left 1 Implanted   STEM FEM SZ 9 L102.5MM NK L39.6MM 133DEG HI OFFSET HIP TI - [id number]  STEM FEM SZ 9 L102.5MM NK L39.6MM 133DEG HI OFFSET HIP TI  [employer]- [id number] Left 1 Implanted   SLEEVE FEM -6MM OFFSET HIP TYP 1 TAPR FOR CERAMIC BIOLOX - [id number]  SLEEVE FEM -6MM OFFSET HIP TYP 1 TAPR FOR CERAMIC BIOLOX  [employer]- [id number] Left 1 Implanted   HEAD FEM [id number] HIP BIOLOX DELT OPT FOR G7 ACET SYS - [id number]  HEAD FEM OLX60MM HIP BIOLOX DELT OPT FOR G7 ACET SYS  [employer]- [id number] Left 1 Implanted         Drains: * No LDAs found *    Findings:  Infection Present At Time Of Surgery (PATOS) (choose all levels that have infection present):  No infection present  Other Findings: Severe degenerative joint disease    Detailed Description of Procedure:

## 2024-04-04 NOTE — H&P
History and Physical Update    Pt Name: Anjelica Kirk  MRN: [id number]  YOB: 1960  Date of evaluation: 4/4/2024    [x] I have examined the patient and reviewed the H&P/Consult and there are no changes to the patient or plans.    [] I have examined the patient and reviewed the H&P/Consult and have noted the following changes:        Alen Clinton MD   Electronically signed 4/4/2024 at 7:04 AM

## 2024-04-04 NOTE — ANESTHESIA POSTPROCEDURE EVALUATION
Department of Anesthesiology  Postprocedure Note    Patient: Anjelica Kirk  MRN: [id number]  YOB: 1960  Date of evaluation: 4/4/2024    Procedure Summary       Date: 04/04/24 Room / Location: [address]    Anesthesia Start: 0727 Anesthesia Stop: 1012    Procedure: LEFT HIP TOTAL ARTHROPLASTY ANTERIOR APPROACH (Left: Hip) Diagnosis:       Primary osteoarthritis of left hip      (Primary osteoarthritis of left hip [M16.12])    Surgeons: Alen Clinton MD Responsible Provider: Vic Vargas MD    Anesthesia Type: general ASA Status: 2            Anesthesia Type: No value filed.    Jessica Phase I: Jessica Score: 8    Jessica Phase II:      Anesthesia Post Evaluation    Patient location during evaluation: PACU  Patient participation: complete - patient participated  Level of consciousness: awake and alert  Airway patency: patent  Nausea & Vomiting: no nausea and no vomiting  Cardiovascular status: hemodynamically stable  Respiratory status: acceptable  Hydration status: euvolemic  Pain management: adequate    No notable events documented.

## 2024-04-05 VITALS
RESPIRATION RATE: 18 BRPM | WEIGHT: 185 LBS | HEIGHT: 65 IN | DIASTOLIC BLOOD PRESSURE: 63 MMHG | SYSTOLIC BLOOD PRESSURE: 115 MMHG | TEMPERATURE: 98.6 F | BODY MASS INDEX: 30.82 KG/M2 | HEART RATE: 62 BPM | OXYGEN SATURATION: 100 %

## 2024-04-05 PROCEDURE — 6370000000 HC RX 637 (ALT 250 FOR IP): Performed by: ORTHOPAEDIC SURGERY

## 2024-04-05 PROCEDURE — 97530 THERAPEUTIC ACTIVITIES: CPT

## 2024-04-05 PROCEDURE — 97116 GAIT TRAINING THERAPY: CPT

## 2024-04-05 PROCEDURE — 6360000002 HC RX W HCPCS: Performed by: ORTHOPAEDIC SURGERY

## 2024-04-05 PROCEDURE — 97110 THERAPEUTIC EXERCISES: CPT

## 2024-04-05 PROCEDURE — 97535 SELF CARE MNGMENT TRAINING: CPT

## 2024-04-05 PROCEDURE — 2580000003 HC RX 258: Performed by: ORTHOPAEDIC SURGERY

## 2024-04-05 RX ORDER — DOCUSATE SODIUM 100 MG/1
100 CAPSULE, LIQUID FILLED ORAL 2 TIMES DAILY
Qty: 30 CAPSULE | Refills: 0 | Status: SHIPPED | OUTPATIENT
Start: 2024-04-05

## 2024-04-05 RX ORDER — ONDANSETRON 4 MG/1
4 TABLET, FILM COATED ORAL EVERY 6 HOURS PRN
Qty: 30 TABLET | Refills: 1 | Status: SHIPPED | OUTPATIENT
Start: 2024-04-05

## 2024-04-05 RX ORDER — GABAPENTIN 100 MG/1
100 CAPSULE ORAL 3 TIMES DAILY
Qty: 21 CAPSULE | Refills: 0 | Status: SHIPPED | OUTPATIENT
Start: 2024-04-05 | End: 2024-04-12

## 2024-04-05 RX ORDER — IBUPROFEN 600 MG/1
600 TABLET ORAL 3 TIMES DAILY PRN
Qty: 30 TABLET | Refills: 0 | Status: SHIPPED | OUTPATIENT
Start: 2024-04-05

## 2024-04-05 RX ORDER — OXYCODONE HYDROCHLORIDE 5 MG/1
5 TABLET ORAL EVERY 6 HOURS PRN
Qty: 28 TABLET | Refills: 0 | Status: SHIPPED | OUTPATIENT
Start: 2024-04-05 | End: 2024-04-12

## 2024-04-05 RX ORDER — ACETAMINOPHEN 500 MG
1000 TABLET ORAL 3 TIMES DAILY PRN
Qty: 84 TABLET | Refills: 0 | Status: SHIPPED | OUTPATIENT
Start: 2024-04-05 | End: 2024-04-19

## 2024-04-05 RX ORDER — ASPIRIN 325 MG
325 TABLET ORAL 2 TIMES DAILY
Qty: 24 TABLET | Refills: 0 | Status: SHIPPED | OUTPATIENT
Start: 2024-04-05 | End: 2024-04-17

## 2024-04-05 RX ADMIN — ACETAMINOPHEN 650 MG: 325 TABLET ORAL at 00:19

## 2024-04-05 RX ADMIN — BISACODYL 5 MG: 5 TABLET, COATED ORAL at 09:26

## 2024-04-05 RX ADMIN — SODIUM CHLORIDE, PRESERVATIVE FREE 10 ML: 5 INJECTION INTRAVENOUS at 09:25

## 2024-04-05 RX ADMIN — ACETAMINOPHEN 650 MG: 325 TABLET ORAL at 05:14

## 2024-04-05 RX ADMIN — ASPIRIN 325 MG: 325 TABLET, COATED ORAL at 09:26

## 2024-04-05 RX ADMIN — OXYCODONE HYDROCHLORIDE 10 MG: 5 TABLET ORAL at 03:15

## 2024-04-05 RX ADMIN — KETOROLAC TROMETHAMINE 30 MG: 30 INJECTION, SOLUTION INTRAMUSCULAR; INTRAVENOUS at 03:12

## 2024-04-05 RX ADMIN — KETOROLAC TROMETHAMINE 30 MG: 30 INJECTION, SOLUTION INTRAMUSCULAR; INTRAVENOUS at 09:23

## 2024-04-05 ASSESSMENT — PAIN DESCRIPTION - ONSET
ONSET: GRADUAL
ONSET: GRADUAL

## 2024-04-05 ASSESSMENT — PAIN SCALES - GENERAL
PAINLEVEL_OUTOF10: 0
PAINLEVEL_OUTOF10: 7
PAINLEVEL_OUTOF10: 5
PAINLEVEL_OUTOF10: 6

## 2024-04-05 ASSESSMENT — PAIN DESCRIPTION - LOCATION
LOCATION: LEG;HIP
LOCATION: LEG
LOCATION: HIP

## 2024-04-05 ASSESSMENT — PAIN - FUNCTIONAL ASSESSMENT
PAIN_FUNCTIONAL_ASSESSMENT: PREVENTS OR INTERFERES SOME ACTIVE ACTIVITIES AND ADLS
PAIN_FUNCTIONAL_ASSESSMENT: PREVENTS OR INTERFERES SOME ACTIVE ACTIVITIES AND ADLS

## 2024-04-05 ASSESSMENT — PAIN DESCRIPTION - FREQUENCY
FREQUENCY: CONTINUOUS
FREQUENCY: CONTINUOUS

## 2024-04-05 ASSESSMENT — PAIN DESCRIPTION - DESCRIPTORS
DESCRIPTORS: ACHING

## 2024-04-05 ASSESSMENT — PAIN DESCRIPTION - ORIENTATION
ORIENTATION: LEFT

## 2024-04-05 ASSESSMENT — PAIN DESCRIPTION - PAIN TYPE
TYPE: SURGICAL PAIN
TYPE: SURGICAL PAIN

## 2024-04-05 NOTE — PROGRESS NOTES
CLINICAL PHARMACY NOTE: MEDS TO BEDS    Total # of Prescriptions Filled: 7   The following medications were delivered to the patient:  Docusate 100 mg caps  Ondansetron 4 mg tabs  Ibuprofen 600 mg tabs  Acetaminophen 500 mg tabs  Aspirin 325 mg tabs  Oxycodone 5 mg tabs  Gabapentin 100 mg caps    Additional Documentation:     Pt picked up at the pharmacy  
Medications received from pharmacy, IV and telemetry removed, discharge teaching/instructions reviewed with patient using AVS. Patient voiced understanding regarding prescriptions/medications, follow up appointments, and care of self at home. Dgtr in and patient taken with belongings to front entrance and discharged home  
Occupational Therapy  Facility/Department: [facility] MED SURG  Occupational Therapy Initial Assessment    Name: Anjelica Kirk  : 1960  MRN: [id number]  Date of Service: 2024    LIZA BEE reports patient is medically stable for therapy treatment this date.    Chart reviewed prior to treatment and patient is agreeable for therapy.  All lines intact and patient positioned comfortably at end of treatment.  All patient needs addressed prior to ending therapy session.        Discharge Recommendations:  Patient would benefit from continued therapy after discharge  Due to recent hospitalization and medical condition, pt would benefit from additional intermittent skilled therapy at time of discharge.  Please refer to the AM-PAC score for current functional status.     OT Equipment Recommendations  Equipment Needed: Yes  Mobility Devices: ADL Assistive Devices  ADL Assistive Devices: Emergency Alert System;Long-handled Shoe Horn;Long-handled Sponge;Reacher;Sock-Aid Hard       Patient Diagnosis(es): There were no encounter diagnoses.  Past Medical History:  has a past medical history of Arthritis, COVID-19, and Diverticulitis.  Past Surgical History:  has a past surgical history that includes Colonoscopy; Wrist surgery (Left); Tubal ligation; pr i&d deep absc bursa/hematoma thigh/knee region (Left, 2018); Leg Surgery; pr biopsy of skin lesion (Left, 2018); joint replacement (Right); [place] tooth extraction; and Total hip arthroplasty (Left, 2024).    MARIVEL Clinton      Assessment   Performance deficits / Impairments: Decreased ADL status;Decreased functional mobility ;Decreased safe awareness;Decreased cognition;Decreased balance;Decreased posture;Decreased high-level IADLs;Decreased endurance;Decreased strength  Assessment: Pt tolerated OT eval fair. Pt is limited by decreased activity tolerance, pain, dizziness/lightheadedness. Pt completed same day mobility and verbalized understanding of education 
Ortho Face-to-Face Discussion of Medical Necessity for Use of Assistive Device after Joint Replacement Surgery    This patient was evaluated today and a DME order was entered for a wheeled walker because the patient requires this to successfully complete daily living tasks of ambulating.  A wheeled walker is necessary due to the patient's unsteady gait, upper body weakness, and inability to  an ambulation device; and they can ambulate only by pushing a walker instead of a lesser assistive device such as a cane, crutch, or standard walker.  The need for this equipment was discussed with the patient and they understand and are in agreement.   
The patient arrived to the room from PACU; awake and alert. Oriented to call light, bed mechanics and safety. Call light in reach.   
endurance.      ASSESSMENT/PROGRESS TOWARDS GOALS     [facility] Inpatient Mobility Raw Score 19   [facility] 45[facility]   Mobility Inpatient CMS 0-100% Score 41.77   Mobility Inpatient CMS G-Code Modifier CK     Assessment  Assessment: Patient is s/p NILES this date.  Patient with progression in ambulation and endurance this date.  Patient is a SBA with RW for mobility and transfer activities.  Will continue to progress patient as tolerated.  Activity Tolerance: Patient tolerated treatment well  Discharge Recommendations: Patient would benefit from continued therapy after discharge  PT Equipment Recommendations  Equipment Needed: No    Goals  Patient Goals   Patient Goals : Less pain, to walk better  Short Term Goals  Time Frame for Short Term Goals: 10 visits  Short Term Goal 1: Pt to demonstrate bed mobility Anny while maintaining L NILES surgical precautions  Short Term Goal 2: Pt to perform STS transfers w/ RW independently  Short Term Goal 3: Pt to ambulate at least 50ft w/ RW independently  Short Term Goal 4: Pt to be indep w/ TKA HEP    PLAN OF CARE/SAFETY  Physical Therapy Plan  General Plan: 2 times a day 7 days a week  Current Treatment Recommendations: Strengthening;ROM;Balance training;Functional mobility training;Transfer training;Gait training;Neuromuscular re-education;Home exercise program;Endurance training;Pain management;Safety education & training;Patient/Caregiver education & training;Equipment evaluation, education, & procurement;Therapeutic activities  Safety Devices  Type of Devices: Call light within reach;Gait belt;Nurse notified;Chair alarm in place;Left in chair  Restraints  Restraints Initially in Place: No    EDUCATION  Education  Education Given To: Patient  Education Provided: Precautions;Safety  Education Provided Comments: Education on the importance of utilizing a reacher to pick items up off the floor and to retreive items out of reach.  Education Method: 
with use of bedrails as needed.  Short Term Goal 5: I with fall prevention edu, EC/WS tech, recommendations for discharge/AE, condition specific edu, RICKY hose wear/purpose, safe car transfers with use of handouts as needed.    AM-PAC Score        AM-PAC Inpatient Daily Activity Raw Score: 20 (04/05/24 0830)  AM-PAC Inpatient ADL T-Scale Score : 42.03 (04/05/24 0830)  ADL Inpatient CMS 0-100% Score: 38.32 (04/05/24 0830)  ADL Inpatient CMS G-Code Modifier : CJ (04/05/24 0830)      Therapy Time   Individual Concurrent Group Co-treatment   Time In 0750         Time Out 0829         Minutes 39                 Priti Uriarte JENNIFER             
Significant time spent on pt education this date.  Pt educated on: purpose of acute PT eval, importance of continued mobility throughout admission & upon discharge, general safety awareness, fall risk prevention, pursed lip breathing, paced breathing for pain control, use of RICKY hose, s/s of infection, NILES HEP, NILES surgical precautions, use of sheet as leg  in order to maintain NILES surgical precautions, safe transfers & ambulation w/ RW, proper gait sequence w/ RW post-op, RW safety, and PT POC.  Pt verbalized fair understanding.  Pt & pt's family denying any questions/concerns upon writer's exit.    Education Method: Demonstration;Verbal  Education Outcome: Verbalized understanding      Therapy Time   Individual Concurrent Group Co-treatment   Time In 1415         Time Out 1543         Minutes 88         Treatment time: 78 minutes     Co-treatment with OT warranted first time up day of surgery. Cotx due to potential risk of decreased sensation, muscle control and proprioception post-op. Decreased safety and independence requiring 2 skilled therapy professionals to address individual discipline's goals.       Carola Flannery, PT

## 2024-04-05 NOTE — DISCHARGE INSTRUCTIONS
Please leave drsg in place to incision site  Call and schedule follow-up appt to be seen in 2 weeks

## 2024-04-05 NOTE — PLAN OF CARE
Problem: Discharge Planning  Goal: Discharge to home or other facility with appropriate resources  Outcome: Progressing  Flowsheets  Taken 4/4/2024 1322  Discharge to home or other facility with appropriate resources:   Identify barriers to discharge with patient and caregiver   Arrange for needed discharge resources and transportation as appropriate   Identify discharge learning needs (meds, wound care, etc)   Refer to discharge planning if patient needs post-hospital services based on physician order or complex needs related to functional status, cognitive ability or social support system  Taken 4/4/2024 1250  Discharge to home or other facility with appropriate resources:   Identify barriers to discharge with patient and caregiver   Arrange for needed discharge resources and transportation as appropriate   Identify discharge learning needs (meds, wound care, etc)   Refer to discharge planning if patient needs post-hospital services based on physician order or complex needs related to functional status, cognitive ability or social support system     Problem: Cardiovascular - Adult  Goal: Maintains optimal cardiac output and hemodynamic stability  Outcome: Progressing  Goal: Absence of cardiac dysrhythmias or at baseline  Outcome: Progressing     Problem: Respiratory - Adult  Goal: Achieves optimal ventilation and oxygenation  Outcome: Progressing     Problem: Gastrointestinal - Adult  Goal: Minimal or absence of nausea and vomiting  Outcome: Progressing  Goal: Maintains or returns to baseline bowel function  Outcome: Progressing     Problem: Genitourinary - Adult  Goal: Absence of urinary retention  Outcome: Progressing     Problem: Metabolic/Fluid and Electrolytes - Adult  Goal: Electrolytes maintained within normal limits  Outcome: Progressing     Problem: Skin/Tissue Integrity - Adult  Goal: Skin integrity remains intact  Outcome: Progressing  Goal: Incisions, wounds, or drain sites healing without S/S of 
vomiting: Administer IV fluids as ordered to ensure adequate hydration  4/4/2024 1824 by Addis Nascimento RN  Outcome: Progressing  Goal: Maintains or returns to baseline bowel function  Recent Flowsheet Documentation  Taken 4/4/2024 2100 by Eri Vazquez RN  Maintains or returns to baseline bowel function: Assess bowel function  4/4/2024 1824 by Addis Nascimento RN  Outcome: Progressing     Problem: Genitourinary - Adult  Goal: Absence of urinary retention  Recent Flowsheet Documentation  Taken 4/4/2024 2100 by Eri Vazquez RN  Absence of urinary retention:   Assess patient’s ability to void and empty bladder   Monitor intake/output and perform bladder scan as needed  4/4/2024 1824 by Addis Nascimento RN  Outcome: Progressing     Problem: Metabolic/Fluid and Electrolytes - Adult  Goal: Electrolytes maintained within normal limits  Recent Flowsheet Documentation  Taken 4/4/2024 2100 by Eri Vazquez RN  Electrolytes maintained within normal limits:   Monitor labs and assess patient for signs and symptoms of electrolyte imbalances   Administer electrolyte replacement as ordered  4/4/2024 1824 by Addis Nascimento RN  Outcome: Progressing     Problem: Skin/Tissue Integrity - Adult  Goal: Skin integrity remains intact  4/4/2024 2312 by Eri Vazquez RN  Outcome: Progressing  Flowsheets  Taken 4/4/2024 2100 by Eri Vazquez RN  Skin Integrity Remains Intact: Monitor for areas of redness and/or skin breakdown  Taken 4/4/2024 1825 by Addis Nascimento RN  Skin Integrity Remains Intact: Monitor for areas of redness and/or skin breakdown  4/4/2024 1824 by Addis Nascimento RN  Outcome: Progressing  Goal: Incisions, wounds, or drain sites healing without S/S of infection  4/4/2024 1824 by Addis Nascimento RN  Outcome: Progressing     Problem: Hematologic - Adult  Goal: Maintains hematologic stability  Recent Flowsheet Documentation  Taken 4/4/2024 2100 by Eri Vazquez RN  Maintains

## 2024-04-05 NOTE — CARE COORDINATION
Patient accepted by [facility] for [facility]. Patient would be appropriate for discharge lounge if she does not have immediate transportation at discharge.    Discharge Report    [facility]  Clinical Case Management Department  Written by: Jeanne Denis RN    Patient Name: Anjelica Kirk  Attending Provider: Alen Clinton MD  Admit Date: 2024  5:58 AM  MRN: [id number]  Account: [id number]                     : 1960  Discharge Date: 24      Disposition: home w/ [facility] Denis RN

## 2024-04-05 NOTE — FLOWSHEET NOTE
04/05/24 1022   Treatment Team Notification   Reason for Communication Evaluate  (discharge plans/orders)   Name of Team Member Notified Dr Clinton   Treatment Team Role Attending Provider   Method of Communication Secure Message   Response Waiting for response   Notification Time 1027

## 2024-04-17 ENCOUNTER — TRANSCRIBE ORDERS (OUTPATIENT)
Dept: ADMINISTRATIVE | Age: 64
End: 2024-04-17

## 2024-04-17 ENCOUNTER — HOSPITAL ENCOUNTER (OUTPATIENT)
Dept: VASCULAR LAB | Age: 64
Discharge: HOME | End: 2024-04-19
Attending: ORTHOPAEDIC SURGERY
Payer: COMMERCIAL

## 2024-04-17 DIAGNOSIS — M79.89 SOFT TISSUE CALCIFICATION: ICD-10-CM

## 2024-04-17 DIAGNOSIS — M79.662 PAIN OF LEFT LOWER LEG: ICD-10-CM

## 2024-04-17 DIAGNOSIS — M79.661 PAIN OF RIGHT LOWER LEG: ICD-10-CM

## 2024-04-17 DIAGNOSIS — Z96.642 PRESENCE OF ARTIFICIAL HIP JOINT, LEFT: ICD-10-CM

## 2024-04-17 DIAGNOSIS — Z96.642 PRESENCE OF ARTIFICIAL HIP JOINT, LEFT: Primary | ICD-10-CM

## 2024-04-17 PROCEDURE — 93970 EXTREMITY STUDY: CPT

## 2024-04-17 PROCEDURE — 93970 EXTREMITY STUDY: CPT | Performed by: SURGERY

## (undated) DEVICE — GLOVE SURG SZ 75 CRM LTX FREE POLYISOPRENE POLYMER BEAD ANTI

## (undated) DEVICE — PREP SOL PVP IODINE 4%  4 OZ/BTL

## (undated) DEVICE — SUTURE MCRYL SZ 3-0 L27IN ABSRB UD PS-2 3/8 CIR REV CUT NDL MCP427H

## (undated) DEVICE — SOLUTION PREP PEROXIDE HYDROGEN 3% 4 OZ 1

## (undated) DEVICE — 2108 SERIES SAGITTAL BLADE, NO OFFSET  (18.6 X 1.24 X 105MM)

## (undated) DEVICE — GOWN,SIRUS,NONRNF,SETINSLV,XL,20/CS: Brand: MEDLINE

## (undated) DEVICE — SOLUTION IRRIG 3000ML 0.9% SOD CHL USP UROMATIC PLAS CONT

## (undated) DEVICE — STOCKINETTE,IMPERVIOUS,12X48,STERILE: Brand: MEDLINE

## (undated) DEVICE — SOLUTION SURG PREP POV IOD 7.5% 4 OZ

## (undated) DEVICE — SUTURE VCRL SZ 1 L27IN ABSRB UD L36MM CP-1 1/2 CIR REV CUT J268H

## (undated) DEVICE — BANDAGE,GAUZE,BULKEE II,4.5"X4.1YD,STRL: Brand: MEDLINE

## (undated) DEVICE — BNDG,ELSTC,MATRIX,STRL,6"X5YD,LF,HOOK&LP: Brand: MEDLINE

## (undated) DEVICE — COVER,MAYO STAND,XL,STERILE: Brand: MEDLINE

## (undated) DEVICE — GLOVE SURG SZ 85 STD WHT LTX SYN POLYMER BEAD REINF ANTI RL

## (undated) DEVICE — GAUZE,SPONGE,FLUFF,6"X6.75",STRL,5/TRAY: Brand: MEDLINE

## (undated) DEVICE — SYRINGE, LUER LOCK, 10ML: Brand: MEDLINE

## (undated) DEVICE — Device

## (undated) DEVICE — 450 ML BOTTLE OF 0.05% CHLORHEXIDINE GLUCONATE IN 99.95% STERILE WATER FOR IRRIGATION, USP AND APPLICATOR.: Brand: IRRISEPT ANTIMICROBIAL WOUND LAVAGE

## (undated) DEVICE — SUTURE STRATAFIX SPRL MCRYL + SZ 2-0 L18IN ABSRB UD CT-1 SXMP1B413

## (undated) DEVICE — Z DISCONTINUED BY MEDLINE USE 2711682 TRAY SKIN PREP DRY W/ PREM GLV

## (undated) DEVICE — SINGLE PORT MANIFOLD: Brand: NEPTUNE 2

## (undated) DEVICE — BANDAGE COMPR W4INXL5YD WHT BGE POLY COT M E WRP WV HK AND

## (undated) DEVICE — SUTURE VCRL SZ 0 L27IN ABSRB UD L36MM CP-1 1/2 CIR REV CUT J267H

## (undated) DEVICE — DRESSING HYDROFIBER AQUACEL AG ADVANTAGE 3.5X10 IN

## (undated) DEVICE — 3M™ IOBAN™ 2 ANTIMICROBIAL INCISE DRAPE 6651EZ: Brand: IOBAN™ 2

## (undated) DEVICE — SOLUTION IV IRRIG POUR BRL 0.9% SODIUM CHL 2F7124

## (undated) DEVICE — PACK ARTHRO W PCH

## (undated) DEVICE — STANDARD HYPODERMIC NEEDLE,POLYPROPYLENE HUB: Brand: MONOJECT

## (undated) DEVICE — SHEET, ORTHO, SPLIT, STERILE: Brand: MEDLINE

## (undated) DEVICE — SPONGE LAP W18XL18IN WHT COT 4 PLY FLD STRUNG RADPQ DISP ST

## (undated) DEVICE — DRAPE,U/ SHT,SPLIT,PLAS,STERIL: Brand: MEDLINE

## (undated) DEVICE — NEEDLE SPNL 18GA L3.5IN W/ QNCKE SHARPER BVL DURA CLICK

## (undated) DEVICE — SUTURE STRATAFIX SYMMETRIC PDS + SZ 1 L18IN ABSRB VLT L48MM SXPP1A400

## (undated) DEVICE — GLOVE SURG SZ 8 L12IN FNGR THK79MIL GRN LTX FREE

## (undated) DEVICE — 4-PORT MANIFOLD: Brand: NEPTUNE 2

## (undated) DEVICE — MASTISOL ADHESIVE LIQ 2/3ML

## (undated) DEVICE — DRAPE,T,LIMB,BILATERAL,STERILE: Brand: MEDLINE

## (undated) DEVICE — DRAPE,REIN 53X77,STERILE: Brand: MEDLINE